# Patient Record
Sex: MALE | Race: WHITE | NOT HISPANIC OR LATINO | Employment: OTHER | URBAN - METROPOLITAN AREA
[De-identification: names, ages, dates, MRNs, and addresses within clinical notes are randomized per-mention and may not be internally consistent; named-entity substitution may affect disease eponyms.]

---

## 2023-05-23 ENCOUNTER — TRANSFERRED RECORDS (OUTPATIENT)
Dept: HEALTH INFORMATION MANAGEMENT | Facility: CLINIC | Age: 68
End: 2023-05-23

## 2023-06-05 ENCOUNTER — TRANSFERRED RECORDS (OUTPATIENT)
Dept: HEALTH INFORMATION MANAGEMENT | Facility: CLINIC | Age: 68
End: 2023-06-05

## 2023-06-06 ENCOUNTER — TRANSFERRED RECORDS (OUTPATIENT)
Dept: HEALTH INFORMATION MANAGEMENT | Facility: CLINIC | Age: 68
End: 2023-06-06

## 2023-06-06 LAB
ALT SERPL-CCNC: 42 U/L
AST SERPL-CCNC: 43 U/L (ref 17–59)
CREATININE (EXTERNAL): 0.83 MG/DL (ref 0.66–1.25)
GFR ESTIMATED (EXTERNAL): >90 ML/MIN/1.73M2
GLUCOSE (EXTERNAL): 138 MG/DL (ref 75–100)
HBA1C MFR BLD: 6.3 %
POTASSIUM (EXTERNAL): 4.3 MMOL/L (ref 3.5–5.1)

## 2023-06-07 ENCOUNTER — MEDICAL CORRESPONDENCE (OUTPATIENT)
Dept: HEALTH INFORMATION MANAGEMENT | Facility: CLINIC | Age: 68
End: 2023-06-07

## 2023-06-07 ENCOUNTER — TRANSFERRED RECORDS (OUTPATIENT)
Dept: HEALTH INFORMATION MANAGEMENT | Facility: CLINIC | Age: 68
End: 2023-06-07

## 2023-06-23 ENCOUNTER — TRANSCRIBE ORDERS (OUTPATIENT)
Dept: OTHER | Age: 68
End: 2023-06-23

## 2023-06-23 DIAGNOSIS — E83.119 HEMOCHROMATOSIS, UNSPECIFIED HEMOCHROMATOSIS TYPE: ICD-10-CM

## 2023-06-23 DIAGNOSIS — K76.9 LIVER LESION, RIGHT LOBE: Primary | ICD-10-CM

## 2023-06-27 NOTE — CONFIDENTIAL NOTE
DIAGNOSIS:    Liver lesion, right lobe   Hemochromatosis, unspecified hemochromatosis type      Appt Date: 08.25.2023   NOTES STATUS DETAILS   OFFICE NOTE from referring provider Received / CE 06.26.2023 Jaylene De Leon MD - [facility]   OFFICE NOTES from other specialists     DISCHARGE SUMMARY from hospital     MEDICATION LIST Care Everywhere / Received    LIVER BIOSPY (IF APPLICABLE)      PATHOLOGY REPORTS      IMAGING     ENDOSCOPY (IF AVAILABLE)     COLONOSCOPY (IF AVAILABLE)     ULTRASOUND LIVER Care Everywhere - Received 05.23.2023 US Abd Limited Liver   CT OF ABDOMEN  06.05.2023 CT Abd Pelvis   MRI OF LIVER Care Everywhere - Received 06.05.2023 MR ABDOMEN LIVER WWO     FIBROSCAN, US ELASTOGRAPHY, FIBROSIS SCAN, MR ELASTOGRAPHY     LABS     HEPATIC PANEL (LIVER PANEL)     BASIC METABOLIC PANEL     COMPLETE METABOLIC PANEL Care Everywhere 06.06.2023   COMPLETE BLOOD COUNT (CBC) Care Everywhere 06.06.2023   INTERNATIONAL NORMALIZED RATIO (INR)     HEPATITIS C ANTIBODY     HEPATITIS C VIRAL LOAD/PCR     HEPATITIS C GENOTYPE     HEPATITIS B SURFACE ANTIGEN     HEPATITIS B SURFACE ANTIBODY     HEPATITIS B DNA QUANT LEVEL     HEPATITIS B CORE ANTIBODY       Action 06.27.2023 RM   Action Taken Pending images     Action 06.29.2023 RM   Action Taken Called Ralf to get images pushed called [phone] was told to fax to [phone] PENDING

## 2023-07-14 ENCOUNTER — DOCUMENTATION ONLY (OUTPATIENT)
Dept: TRANSPLANT | Facility: CLINIC | Age: 68
End: 2023-07-14
Payer: MEDICARE

## 2023-07-14 ENCOUNTER — HOSPITAL ENCOUNTER (INPATIENT)
Dept: GENERAL RADIOLOGY | Facility: CLINIC | Age: 68
Discharge: HOME | End: 2023-07-14
Attending: STUDENT IN AN ORGANIZED HEALTH CARE EDUCATION/TRAINING PROGRAM
Payer: MEDICARE

## 2023-07-14 ENCOUNTER — VIRTUAL VISIT (OUTPATIENT)
Dept: GASTROENTEROLOGY | Facility: CLINIC | Age: 68
End: 2023-07-14
Attending: INTERNAL MEDICINE
Payer: MEDICARE

## 2023-07-14 VITALS — WEIGHT: 232 LBS | BODY MASS INDEX: 35.16 KG/M2 | HEIGHT: 68 IN

## 2023-07-14 DIAGNOSIS — K74.60 CIRRHOSIS OF LIVER (H): ICD-10-CM

## 2023-07-14 DIAGNOSIS — K76.9 LIVER LESION, RIGHT LOBE: ICD-10-CM

## 2023-07-14 DIAGNOSIS — E83.119 HEMOCHROMATOSIS, UNSPECIFIED HEMOCHROMATOSIS TYPE: ICD-10-CM

## 2023-07-14 DIAGNOSIS — C22.0 HCC (HEPATOCELLULAR CARCINOMA) (H): Primary | ICD-10-CM

## 2023-07-14 PROCEDURE — 999N000122 MR OUTSIDE READ

## 2023-07-14 PROCEDURE — 74183 MRI ABD W/O CNTR FLWD CNTR: CPT | Mod: 26 | Performed by: STUDENT IN AN ORGANIZED HEALTH CARE EDUCATION/TRAINING PROGRAM

## 2023-07-14 PROCEDURE — 99205 OFFICE O/P NEW HI 60 MIN: CPT | Mod: VID | Performed by: STUDENT IN AN ORGANIZED HEALTH CARE EDUCATION/TRAINING PROGRAM

## 2023-07-14 RX ORDER — AMLODIPINE BESYLATE 10 MG/1
1 TABLET ORAL DAILY
COMMUNITY
Start: 2013-07-10

## 2023-07-14 RX ORDER — ATORVASTATIN CALCIUM 10 MG/1
5 TABLET, FILM COATED ORAL
COMMUNITY
Start: 2013-07-10

## 2023-07-14 RX ORDER — LANCETS
EACH MISCELLANEOUS
COMMUNITY
Start: 2023-04-20

## 2023-07-14 RX ORDER — LISINOPRIL AND HYDROCHLOROTHIAZIDE 12.5; 2 MG/1; MG/1
1 TABLET ORAL DAILY
COMMUNITY

## 2023-07-14 RX ORDER — BLOOD SUGAR DIAGNOSTIC
STRIP MISCELLANEOUS
COMMUNITY
Start: 2023-04-25

## 2023-07-14 ASSESSMENT — PAIN SCALES - GENERAL: PAINLEVEL: NO PAIN (0)

## 2023-07-14 NOTE — PROGRESS NOTES
Virtual Visit Details    Type of service:  Video Visit   Video Start Time: 11:00 AM  Video End Time: 11:28 AM     Originating Location (pt. Location): Home    Distant Location (provider location):  Off-site  Platform used for Video Visit: [facility] New Patient Visit    Date of Visit: July 14, 2023    Reason for referral:  Liver lesion, cirrhosis    Subjective: Mr. Boston is a 67 year old man with a history of HTN, HLD, DM, hemochromatosis on phlebotomy, who presents for evaluation of cirrhosis and a liver lesion seen on MRI    He was diagnosed with hemochromatosis years ago after his brother was. 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 Gets it every 2-3 months and his ferritin has been > 50. He has had imaging in the past that has showed hepatomegaly and steatosis, without signs of cirrhosis. He has never had a liver bx, has not followed with hepatology or GI.     He had a RUQ US 5/2023 that showed a mass in the right lobe of the liver with background cirrhosis. This was followed up by a liver MRI that showed LR 4 lesion in the right lobe of the liver. Also showed cirrhosis with evidence of regenerative nodules. No ascites.     He reports he stopped drinking 3 months ago, was drinking 5 beers/week. No history of treatment or DUIs.     Has a good functional status, can walk 1/2 mile several times a day    ROS: 14 point ROS negative except for positives noted in HPI.    PMHx:  DM  HTN  HLD  No known history of heart disease  No personal history of cancer    PSHx:  Ankle surgery for fracture  R parotid grand resection due to enlargement - thought it was cancer but it wasn't    FamHx:  No family history of liver disease, liver cancer  Brother with HC  Other brother had a liver transplant for ARLD  CVA    SocHx:  Social History     Socioeconomic History     Marital status:      Spouse name: Not on file     Number of children: Not on file     Years of  "education: Not on file     Highest education level: Not on file   Occupational History     Not on file   Tobacco Use     Smoking status: Former     Years: 30.00     Types: Cigarettes     Smokeless tobacco: Never   Vaping Use     Vaping Use: Never used   Substance and Sexual Activity     Alcohol use: Not on file     Drug use: Not on file     Sexual activity: Not on file   Other Topics Concern     Not on file   Social History Narrative     Not on file     Social Determinants of Health     Financial Resource Strain: Not on file   Food Insecurity: Not on file   Transportation Needs: Not on file   Physical Activity: Not on file   Stress: Not on file   Social Connections: Not on file   Intimate Partner Violence: Not on file   Housing Stability: Not on file   Last drink 3 months ago, would drink 5 beers/week, denies history of DUI  YOSSI - Felicity helps with care  Retired - previously worked as a  for [place] [employer] [place]    Medications:  Current Outpatient Medications   Medication     ACCU-CHEK GUIDE test strip     amLODIPine (NORVASC) 10 MG tablet     atorvastatin (LIPITOR) 10 MG tablet     blood glucose monitoring (ACCU-CHEK FASTCLIX) lancets     lisinopril-hydrochlorothiazide (ZESTORETIC) 20-12.5 MG tablet     metFORMIN (GLUCOPHAGE) 1000 MG tablet     study - aspirin, IDS# [id number], 81 mg tablet     No current facility-administered medications for this visit.     No OTCs, herbals    Allergies:  Allergies   Allergen Reactions     Atorvastatin Other (See Comments)     Other reaction(s): *Unknown  Abnormal Liver Functions, ( is taking this at a small dose)  Abnormal Liver Functions, ( is taking this at a small dose)       Penicillins Other (See Comments) and Unknown     Other reaction(s): *Unknown - Childhood Rxn         Objective:  Ht 1.727 m (5' 8\")   Wt 105.2 kg (232 lb)   BMI 35.28 kg/m    Constitutional: pleasant man in NAD  Eyes: non icteric  Respiratory: Normal respiratory " excursion   MSK: normal range of motion of visualized extremities  Abd: Non distended  Skin: No jaundice  Psychiatric: normal mood and orientation    Labs: Reviewed in EHR, no recent AFP    TBR 1.2   Platelets 139,000    Imaging: Reviewed in EHR    Independently reviewed labs and imaging.     Assessment/Plan: Mr. Boston is a 67 year old man with a history of HTN, HLD, DM, hemochromatosis on phlebotomy, who presents for evaluation of cirrhosis and a liver lesion seen on MRI.     Our radiology team needs more time to review his imaging, will request an over read. If LR5 lesions would refer to IR for LR treatment. Would also refer for liver transplant if T2 and within [place]. Would also need CT chest. The last MRI was done about 6 weeks ago, so may consider a repeat imaging study.     Discussed HCC as an indication for LT evaluation. Discussed the basics of transplant evaluation, including medical, surgery, and social components. Will ask if he has had an colonoscopy, I do not have records of this.     Orders Placed This Encounter   Procedures     AFP tumor marker     Adult GI  Referral - Procedure Only       RTC 6 months.    Mary Mueller MD MS  Hepatology/Liver Transplant  [facility]    Approximately 28 minutes was spent for the visit with 32 minutes of non face-to-face time were spent in review of the patient's medical record on the day of the visit. This included review of previous: clinic visits, hospital records, lab results, imaging studies, and documentation.  The findings from this review are summarized in the above note.

## 2023-07-14 NOTE — PROGRESS NOTES
OSH MRI 6/5/23  1.) seg 4a lesion - 2.3 cm enhance & washout 5B  2.) other potential lesion - LR-4    Recs:  - outside read needed  - IR or other treatment of 2.3 cm lesion  - consideration of treatment for LR-4 lesion    Dr. Mueller to notify me if patient to proceed with transplant referral.

## 2023-07-14 NOTE — NURSING NOTE
Is the patient currently in the state of [place]? YES    Visit mode:VIDEO    If the visit is dropped, the patient can be reconnected by: VIDEO VISIT: Text to cell phone: [phone]    Will anyone else be joining the visit? NO      How would you like to obtain your AVS? MyChart    Are changes needed to the allergy or medication list? YES: All medications reconciled per pt    Reason for visit: Consult      Gisella Contreras

## 2023-07-15 ENCOUNTER — HOSPITAL ENCOUNTER (INPATIENT)
Dept: GENERAL RADIOLOGY | Facility: CLINIC | Age: 68
Discharge: HOME | End: 2023-07-15
Attending: STUDENT IN AN ORGANIZED HEALTH CARE EDUCATION/TRAINING PROGRAM
Payer: MEDICARE

## 2023-07-15 DIAGNOSIS — K74.60 CIRRHOSIS OF LIVER (H): ICD-10-CM

## 2023-07-15 DIAGNOSIS — C22.0 HCC (HEPATOCELLULAR CARCINOMA) (H): ICD-10-CM

## 2023-07-17 ENCOUNTER — MYC MEDICAL ADVICE (OUTPATIENT)
Dept: GASTROENTEROLOGY | Facility: CLINIC | Age: 68
End: 2023-07-17
Payer: MEDICARE

## 2023-07-17 DIAGNOSIS — K70.30 ALCOHOLIC CIRRHOSIS OF LIVER (H): Primary | ICD-10-CM

## 2023-07-17 NOTE — TELEPHONE ENCOUNTER
EGD order faxed to patient requested location as stated in mychart message.    Lila DEL CID,ELENA  Hepatology Clinic

## 2023-07-25 ENCOUNTER — TELEPHONE (OUTPATIENT)
Dept: GASTROENTEROLOGY | Facility: CLINIC | Age: 68
End: 2023-07-25
Payer: MEDICARE

## 2023-07-25 NOTE — TELEPHONE ENCOUNTER
Called patient to schedule labs 1 week prior to appointment on 1/19/2024. Sent staff message to have clinic staff to fax order to [facility] in [place], [place] // 07/25/2023 MCE

## 2023-07-28 DIAGNOSIS — K76.9 LIVER LESION: Primary | ICD-10-CM

## 2023-07-28 NOTE — CONFIDENTIAL NOTE
Reviewed his imaging in tumor board - MRI showing LR-4 in segment 5, and central LR3. Plan for referral to IR to discuss bx and ablation, patient is aware of this. If bx shows HCC, will start liver transplant evaluation.    Hepatology Team - can you fax order for AFP for to his [facility] clinic?

## 2023-07-31 ENCOUNTER — MEDICAL CORRESPONDENCE (OUTPATIENT)
Dept: HEALTH INFORMATION MANAGEMENT | Facility: CLINIC | Age: 68
End: 2023-07-31
Payer: MEDICARE

## 2023-07-31 DIAGNOSIS — K76.9 LIVER LESION: Primary | ICD-10-CM

## 2023-07-31 DIAGNOSIS — E83.119 HEMOCHROMATOSIS, UNSPECIFIED HEMOCHROMATOSIS TYPE: ICD-10-CM

## 2023-08-01 DIAGNOSIS — R93.2 ABNORMAL FINDINGS ON DIAGNOSTIC IMAGING OF LIVER AND BILIARY TRACT: ICD-10-CM

## 2023-08-01 DIAGNOSIS — R97.8 OTHER ABNORMAL TUMOR MARKERS: ICD-10-CM

## 2023-08-01 DIAGNOSIS — K76.9 LIVER LESION: Primary | ICD-10-CM

## 2023-08-03 ENCOUNTER — TELEPHONE (OUTPATIENT)
Dept: GASTROENTEROLOGY | Facility: CLINIC | Age: 68
End: 2023-08-03
Payer: MEDICARE

## 2023-08-03 NOTE — TELEPHONE ENCOUNTER
"MRI order faxed to number provided by patient.    Lila DEL CID LPN  Hepatology Clinic     ---------------  [facility] Call Center    Phone Message    May a detailed message be left on voicemail: yes     Reason for Call: Other: Patient called and needs the \"MR Liver wo & w Contrast\" order refaxed to: [phone].  Please refax.     Action Taken: Message routed to:  [facility] ([facility]    [place] Screening: Not Applicable                                                                    "

## 2023-08-10 ENCOUNTER — TELEPHONE (OUTPATIENT)
Dept: GASTROENTEROLOGY | Facility: CLINIC | Age: 68
End: 2023-08-10
Payer: MEDICARE

## 2023-08-10 NOTE — TELEPHONE ENCOUNTER
"Cecille called from [facility] regarding patient's MRI done 3 days ago.     Informed Cecille Mueller left a voicemail yesterday at 2:40 PM with the information of \"we wanted a repeat because it has been 2 months and was last read as LR-4. Seeing IR soon to discuss biopsy ablation\". Also let Cecille know patient has appointment on 8/15/23 with IR.     Cecille would let the radiologist know.    VICKI Purvis, RN, PHN  Hepatology Clinic  [facility]  [facility]      "

## 2023-08-13 ENCOUNTER — HEALTH MAINTENANCE LETTER (OUTPATIENT)
Age: 68
End: 2023-08-13

## 2023-08-15 ENCOUNTER — HOSPITAL ENCOUNTER (INPATIENT)
Dept: GENERAL RADIOLOGY | Facility: CLINIC | Age: 68
Discharge: HOME | End: 2023-08-15
Attending: RADIOLOGY
Payer: MEDICARE

## 2023-08-15 ENCOUNTER — VIRTUAL VISIT (OUTPATIENT)
Dept: RADIOLOGY | Facility: CLINIC | Age: 68
End: 2023-08-15
Attending: RADIOLOGY
Payer: MEDICARE

## 2023-08-15 VITALS — BODY MASS INDEX: 35.61 KG/M2 | HEIGHT: 68 IN | WEIGHT: 235 LBS

## 2023-08-15 DIAGNOSIS — K76.9 LIVER LESION: ICD-10-CM

## 2023-08-15 PROCEDURE — 74183 MRI ABD W/O CNTR FLWD CNTR: CPT | Mod: 26 | Performed by: RADIOLOGY

## 2023-08-15 PROCEDURE — 99204 OFFICE O/P NEW MOD 45 MIN: CPT | Mod: 95 | Performed by: RADIOLOGY

## 2023-08-15 PROCEDURE — 999N000122 MR OUTSIDE READ

## 2023-08-15 ASSESSMENT — PAIN SCALES - GENERAL: PAINLEVEL: NO PAIN (0)

## 2023-08-15 NOTE — PROGRESS NOTES
Virtual Visit Details    Type of service:  Video Visit   Video Start Time: 12:8  Video End Time:12:23    Originating Location (pt. Location): Home    Distant Location (provider location):  Off-site  Platform used for Video Visit: Max       Interventional Radiology Clinic:    HPI:       Mr. Boston is a 67 year old man with a history of HTN, HLD, DM, hemochromatosis on phlebotomy, and liver lesion referred for evaluation for liver directed therapy.   He mentions that he had Hep B and was also using alcohol in a regular basis but no more alcohol since April 23rd.   The patient has no symptoms and is very active.  I looked at the imaging and labs:    US of RUQ on 5/2023 showed a mass in the right lobe of the liver with background cirrhosis. Liver MRI showed LR 4 lesion in the right lobe (segment5).      Ref Range & Units 6 d ago   ALBUMIN 3.5 - 5.0 g/dL 4.5    PROTEIN,TOTAL 6.3 - 8.2 g/dL 8.1    GLOBULIN                  2.0 - 4.0 g/dL 3.6    A/G RATIO  1.3    BILIRUBIN,TOTAL 0.2 - 1.3 mg/dL 1.7 High     BILIRUBIN,DIRECT 0.0 - 0.3 mg/dL 0.0    BILIRUBIN,INDIRECT 0.0 - 1.1 mg/dL 1.5 High     ALK PHOSPHATASE  38 - 126 U/L 81    AST (SGOT) 17 - 59 U/L 47    ALTv (SGPT) <50 U/L 50 High       AFP: 5.6    His ECOG is 0.        Social History     Tobacco Use     Smoking status: Former     Years: 30.00     Types: Cigarettes     Smokeless tobacco: Never   Substance Use Topics     Alcohol use: Not on file       Impression and Plan:    2 cm liver lesion OPTN 4 by imaging and visible under the US. I discussed the procedure that includes a Biopsy followed by the ablation. The patient understands and would like to proceed. We could use the US in the CT room.  Biopsy and ablation of an OPTN 4 lesion in the segment 5.

## 2023-08-15 NOTE — LETTER
8/15/2023         RE: Dereck Boston  [id number] [place] [address]        Dear Colleague,    Thank you for referring your patient, Dereck Boston, to the [facility]. Please see a copy of my visit note below.    Virtual Visit Details    Type of service:  Video Visit   Video Start Time: 12:8  Video End Time:12:23    Originating Location (pt. Location): Home    Distant Location (provider location):  Off-site  Platform used for Video Visit: [facility]       Interventional Radiology Clinic:    HPI:       Mr. Boston is a 67 year old man with a history of HTN, HLD, DM, hemochromatosis on phlebotomy, and liver lesion referred for evaluation for liver directed therapy.   He mentions that he had Hep B and was also using alcohol in a regular basis but no more alcohol since April 23rd.   The patient has no symptoms and is very active.  I looked at the imaging and labs:    US of RUQ on 5/2023 showed a mass in the right lobe of the liver with background cirrhosis. Liver MRI showed LR 4 lesion in the right lobe (segment5).      Ref Range & Units 6 d ago   ALBUMIN 3.5 - 5.0 g/dL 4.5    PROTEIN,TOTAL 6.3 - 8.2 g/dL 8.1    GLOBULIN                  2.0 - 4.0 g/dL 3.6    A/G RATIO  1.3    BILIRUBIN,TOTAL 0.2 - 1.3 mg/dL 1.7 High     BILIRUBIN,DIRECT 0.0 - 0.3 mg/dL 0.0    BILIRUBIN,INDIRECT 0.0 - 1.1 mg/dL 1.5 High     ALK PHOSPHATASE  38 - 126 U/L 81    AST (SGOT) 17 - 59 U/L 47    ALTv (SGPT) <50 U/L 50 High       AFP: 5.6    His ECOG is 0.        Social History     Tobacco Use    Smoking status: Former     Years: 30.00     Types: Cigarettes    Smokeless tobacco: Never   Substance Use Topics    Alcohol use: Not on file       Impression and Plan:    2 cm liver lesion OPTN 4 by imaging and visible under the US. I discussed the procedure that includes a Biopsy followed by the ablation. The patient understands and would like to proceed. We could use the US in the CT room.  Biopsy and ablation of an OPTN 4  lesion in the segment 5.      Kia Bonilla MD

## 2023-08-15 NOTE — NURSING NOTE
Is the patient currently in the state of [place]? YES    Visit mode:VIDEO    If the visit is dropped, the patient can be reconnected by: VIDEO VISIT: Send to e-mail at: [email or web address]    Will anyone else be joining the visit? NO      How would you like to obtain your AVS? MyChart    Are changes needed to the allergy or medication list? NO    Reason for visit: DAVID DE OLIVEIRA, VF

## 2023-08-25 ENCOUNTER — PRE VISIT (OUTPATIENT)
Dept: GASTROENTEROLOGY | Facility: CLINIC | Age: 68
End: 2023-08-25
Payer: MEDICARE

## 2023-09-19 ENCOUNTER — ANESTHESIA EVENT (OUTPATIENT)
Dept: SURGERY | Facility: CLINIC | Age: 68
End: 2023-09-19
Payer: MEDICARE

## 2023-09-20 ENCOUNTER — APPOINTMENT (OUTPATIENT)
Dept: INTERVENTIONAL RADIOLOGY/VASCULAR | Facility: CLINIC | Age: 68
End: 2023-09-20
Attending: RADIOLOGY
Payer: MEDICARE

## 2023-09-20 ENCOUNTER — HOSPITAL ENCOUNTER (OUTPATIENT)
Facility: CLINIC | Age: 68
Discharge: HOME | End: 2023-09-20
Attending: RADIOLOGY | Admitting: RADIOLOGY
Payer: MEDICARE

## 2023-09-20 ENCOUNTER — ANESTHESIA (OUTPATIENT)
Dept: SURGERY | Facility: CLINIC | Age: 68
End: 2023-09-20
Payer: MEDICARE

## 2023-09-20 VITALS
HEART RATE: 86 BPM | TEMPERATURE: 97.9 F | OXYGEN SATURATION: 94 % | WEIGHT: 230.38 LBS | DIASTOLIC BLOOD PRESSURE: 84 MMHG | HEIGHT: 67 IN | RESPIRATION RATE: 16 BRPM | BODY MASS INDEX: 36.16 KG/M2 | SYSTOLIC BLOOD PRESSURE: 144 MMHG

## 2023-09-20 VITALS — HEART RATE: 82 BPM

## 2023-09-20 DIAGNOSIS — K76.9 LIVER LESION: ICD-10-CM

## 2023-09-20 LAB
ABO/RH(D): NORMAL
ALBUMIN SERPL BCG-MCNC: 4.7 G/DL (ref 3.5–5.2)
ALP SERPL-CCNC: 81 U/L (ref 40–129)
ALT SERPL W P-5'-P-CCNC: 40 U/L (ref 0–70)
ANION GAP SERPL CALCULATED.3IONS-SCNC: 14 MMOL/L (ref 7–15)
ANTIBODY SCREEN: NEGATIVE
AST SERPL W P-5'-P-CCNC: 46 U/L (ref 0–45)
BILIRUB DIRECT SERPL-MCNC: 0.35 MG/DL (ref 0–0.3)
BILIRUB SERPL-MCNC: 1.4 MG/DL
BUN SERPL-MCNC: 16.7 MG/DL (ref 8–23)
CALCIUM SERPL-MCNC: 10 MG/DL (ref 8.8–10.2)
CHLORIDE SERPL-SCNC: 101 MMOL/L (ref 98–107)
CREAT SERPL-MCNC: 0.83 MG/DL (ref 0.67–1.17)
DEPRECATED HCO3 PLAS-SCNC: 21 MMOL/L (ref 22–29)
EGFRCR SERPLBLD CKD-EPI 2021: >90 ML/MIN/1.73M2
ERYTHROCYTE [DISTWIDTH] IN BLOOD BY AUTOMATED COUNT: 12.8 % (ref 10–15)
GLUCOSE BLDC GLUCOMTR-MCNC: 130 MG/DL (ref 70–99)
GLUCOSE BLDC GLUCOMTR-MCNC: 158 MG/DL (ref 70–99)
GLUCOSE SERPL-MCNC: 148 MG/DL (ref 70–99)
HCT VFR BLD AUTO: 43.2 % (ref 40–53)
HGB BLD-MCNC: 15.4 G/DL (ref 13.3–17.7)
INR PPP: 1.1 (ref 0.85–1.15)
MCH RBC QN AUTO: 32 PG (ref 26.5–33)
MCHC RBC AUTO-ENTMCNC: 35.6 G/DL (ref 31.5–36.5)
MCV RBC AUTO: 90 FL (ref 78–100)
PLATELET # BLD AUTO: 154 10E3/UL (ref 150–450)
POTASSIUM SERPL-SCNC: 4.3 MMOL/L (ref 3.4–5.3)
PROT SERPL-MCNC: 8 G/DL (ref 6.4–8.3)
RBC # BLD AUTO: 4.81 10E6/UL (ref 4.4–5.9)
SODIUM SERPL-SCNC: 136 MMOL/L (ref 136–145)
SPECIMEN EXPIRATION DATE: NORMAL
WBC # BLD AUTO: 7.9 10E3/UL (ref 4–11)

## 2023-09-20 PROCEDURE — 82962 GLUCOSE BLOOD TEST: CPT

## 2023-09-20 PROCEDURE — 272N000504 HC NEEDLE CR4

## 2023-09-20 PROCEDURE — 36415 COLL VENOUS BLD VENIPUNCTURE: CPT | Performed by: NURSE PRACTITIONER

## 2023-09-20 PROCEDURE — 85610 PROTHROMBIN TIME: CPT | Performed by: NURSE PRACTITIONER

## 2023-09-20 PROCEDURE — 47000 NEEDLE BIOPSY OF LIVER PERQ: CPT | Mod: GC | Performed by: RADIOLOGY

## 2023-09-20 PROCEDURE — 710N000009 HC RECOVERY PHASE 1, LEVEL 1, PER MIN

## 2023-09-20 PROCEDURE — 76942 ECHO GUIDE FOR BIOPSY: CPT | Mod: 26 | Performed by: RADIOLOGY

## 2023-09-20 PROCEDURE — 86901 BLOOD TYPING SEROLOGIC RH(D): CPT

## 2023-09-20 PROCEDURE — C1769 GUIDE WIRE: HCPCS

## 2023-09-20 PROCEDURE — 255N000002 HC RX 255 OP 636: Performed by: RADIOLOGY

## 2023-09-20 PROCEDURE — 710N000012 HC RECOVERY PHASE 2, PER MINUTE

## 2023-09-20 PROCEDURE — 77012 CT SCAN FOR NEEDLE BIOPSY: CPT

## 2023-09-20 PROCEDURE — 258N000003 HC RX IP 258 OP 636: Performed by: NURSE ANESTHETIST, CERTIFIED REGISTERED

## 2023-09-20 PROCEDURE — 999N000141 HC STATISTIC PRE-PROCEDURE NURSING ASSESSMENT

## 2023-09-20 PROCEDURE — 250N000024 HC ISOFLURANE, PER MIN

## 2023-09-20 PROCEDURE — 82248 BILIRUBIN DIRECT: CPT | Performed by: NURSE PRACTITIONER

## 2023-09-20 PROCEDURE — 272N000505 HC NEEDLE CR5

## 2023-09-20 PROCEDURE — 370N000017 HC ANESTHESIA TECHNICAL FEE, PER MIN

## 2023-09-20 PROCEDURE — 85027 COMPLETE CBC AUTOMATED: CPT | Performed by: NURSE PRACTITIONER

## 2023-09-20 PROCEDURE — 250N000011 HC RX IP 250 OP 636: Performed by: NURSE PRACTITIONER

## 2023-09-20 PROCEDURE — 250N000011 HC RX IP 250 OP 636: Mod: JZ | Performed by: NURSE ANESTHETIST, CERTIFIED REGISTERED

## 2023-09-20 PROCEDURE — 272N000302 HC DEVICE INFLATION CR5

## 2023-09-20 PROCEDURE — 250N000009 HC RX 250: Performed by: NURSE ANESTHETIST, CERTIFIED REGISTERED

## 2023-09-20 PROCEDURE — 86850 RBC ANTIBODY SCREEN: CPT

## 2023-09-20 PROCEDURE — 88307 TISSUE EXAM BY PATHOLOGIST: CPT | Mod: TC | Performed by: RADIOLOGY

## 2023-09-20 PROCEDURE — 250N000009 HC RX 250: Performed by: RADIOLOGY

## 2023-09-20 RX ORDER — FENTANYL CITRATE 50 UG/ML
50 INJECTION, SOLUTION INTRAMUSCULAR; INTRAVENOUS EVERY 5 MIN PRN
Status: DISCONTINUED | OUTPATIENT
Start: 2023-09-20 | End: 2023-09-20 | Stop reason: HOSPADM

## 2023-09-20 RX ORDER — FENTANYL CITRATE 50 UG/ML
INJECTION, SOLUTION INTRAMUSCULAR; INTRAVENOUS PRN
Status: DISCONTINUED | OUTPATIENT
Start: 2023-09-20 | End: 2023-09-20

## 2023-09-20 RX ORDER — NICOTINE POLACRILEX 4 MG
15-30 LOZENGE BUCCAL
Status: DISCONTINUED | OUTPATIENT
Start: 2023-09-20 | End: 2023-09-20 | Stop reason: HOSPADM

## 2023-09-20 RX ORDER — ONDANSETRON 4 MG/1
4 TABLET, ORALLY DISINTEGRATING ORAL EVERY 30 MIN PRN
Status: DISCONTINUED | OUTPATIENT
Start: 2023-09-20 | End: 2023-09-20 | Stop reason: HOSPADM

## 2023-09-20 RX ORDER — LIDOCAINE HYDROCHLORIDE 20 MG/ML
INJECTION, SOLUTION INFILTRATION; PERINEURAL PRN
Status: DISCONTINUED | OUTPATIENT
Start: 2023-09-20 | End: 2023-09-20

## 2023-09-20 RX ORDER — DEXAMETHASONE SODIUM PHOSPHATE 4 MG/ML
INJECTION, SOLUTION INTRA-ARTICULAR; INTRALESIONAL; INTRAMUSCULAR; INTRAVENOUS; SOFT TISSUE PRN
Status: DISCONTINUED | OUTPATIENT
Start: 2023-09-20 | End: 2023-09-20

## 2023-09-20 RX ORDER — HYDROMORPHONE HYDROCHLORIDE 1 MG/ML
0.4 INJECTION, SOLUTION INTRAMUSCULAR; INTRAVENOUS; SUBCUTANEOUS EVERY 5 MIN PRN
Status: DISCONTINUED | OUTPATIENT
Start: 2023-09-20 | End: 2023-09-20 | Stop reason: HOSPADM

## 2023-09-20 RX ORDER — FENTANYL CITRATE 50 UG/ML
25 INJECTION, SOLUTION INTRAMUSCULAR; INTRAVENOUS EVERY 5 MIN PRN
Status: DISCONTINUED | OUTPATIENT
Start: 2023-09-20 | End: 2023-09-20 | Stop reason: HOSPADM

## 2023-09-20 RX ORDER — CEFAZOLIN SODIUM/WATER 2 G/20 ML
2 SYRINGE (ML) INTRAVENOUS SEE ADMIN INSTRUCTIONS
Status: DISCONTINUED | OUTPATIENT
Start: 2023-09-20 | End: 2023-09-20 | Stop reason: HOSPADM

## 2023-09-20 RX ORDER — ONDANSETRON 2 MG/ML
4 INJECTION INTRAMUSCULAR; INTRAVENOUS EVERY 30 MIN PRN
Status: DISCONTINUED | OUTPATIENT
Start: 2023-09-20 | End: 2023-09-20 | Stop reason: HOSPADM

## 2023-09-20 RX ORDER — IODIXANOL 320 MG/ML
100 INJECTION, SOLUTION INTRAVASCULAR ONCE
Status: COMPLETED | OUTPATIENT
Start: 2023-09-20 | End: 2023-09-20

## 2023-09-20 RX ORDER — PROPOFOL 10 MG/ML
INJECTION, EMULSION INTRAVENOUS PRN
Status: DISCONTINUED | OUTPATIENT
Start: 2023-09-20 | End: 2023-09-20

## 2023-09-20 RX ORDER — HYDROMORPHONE HYDROCHLORIDE 1 MG/ML
0.2 INJECTION, SOLUTION INTRAMUSCULAR; INTRAVENOUS; SUBCUTANEOUS EVERY 5 MIN PRN
Status: DISCONTINUED | OUTPATIENT
Start: 2023-09-20 | End: 2023-09-20 | Stop reason: HOSPADM

## 2023-09-20 RX ORDER — SODIUM CHLORIDE 9 MG/ML
INJECTION, SOLUTION INTRAVENOUS CONTINUOUS PRN
Status: DISCONTINUED | OUTPATIENT
Start: 2023-09-20 | End: 2023-09-20

## 2023-09-20 RX ORDER — ONDANSETRON 2 MG/ML
4 INJECTION INTRAMUSCULAR; INTRAVENOUS ONCE
Status: DISCONTINUED | OUTPATIENT
Start: 2023-09-20 | End: 2023-09-20 | Stop reason: HOSPADM

## 2023-09-20 RX ORDER — LIDOCAINE HYDROCHLORIDE 10 MG/ML
1-30 INJECTION, SOLUTION EPIDURAL; INFILTRATION; INTRACAUDAL; PERINEURAL
Status: COMPLETED | OUTPATIENT
Start: 2023-09-20 | End: 2023-09-20

## 2023-09-20 RX ORDER — KETOROLAC TROMETHAMINE 30 MG/ML
15 INJECTION, SOLUTION INTRAMUSCULAR; INTRAVENOUS ONCE
Status: DISCONTINUED | OUTPATIENT
Start: 2023-09-20 | End: 2023-09-20 | Stop reason: HOSPADM

## 2023-09-20 RX ORDER — DEXTROSE MONOHYDRATE 25 G/50ML
25-50 INJECTION, SOLUTION INTRAVENOUS
Status: DISCONTINUED | OUTPATIENT
Start: 2023-09-20 | End: 2023-09-20 | Stop reason: HOSPADM

## 2023-09-20 RX ORDER — ACETAMINOPHEN 325 MG/1
650 TABLET ORAL EVERY 4 HOURS PRN
Status: DISCONTINUED | OUTPATIENT
Start: 2023-09-20 | End: 2023-09-20 | Stop reason: HOSPADM

## 2023-09-20 RX ORDER — CEFAZOLIN SODIUM/WATER 2 G/20 ML
2 SYRINGE (ML) INTRAVENOUS
Status: COMPLETED | OUTPATIENT
Start: 2023-09-20 | End: 2023-09-20

## 2023-09-20 RX ORDER — OXYCODONE HYDROCHLORIDE 10 MG/1
10 TABLET ORAL
Status: DISCONTINUED | OUTPATIENT
Start: 2023-09-20 | End: 2023-09-20 | Stop reason: HOSPADM

## 2023-09-20 RX ORDER — OXYCODONE HYDROCHLORIDE 5 MG/1
5 TABLET ORAL
Status: DISCONTINUED | OUTPATIENT
Start: 2023-09-20 | End: 2023-09-20 | Stop reason: HOSPADM

## 2023-09-20 RX ORDER — SODIUM CHLORIDE, SODIUM LACTATE, POTASSIUM CHLORIDE, CALCIUM CHLORIDE 600; 310; 30; 20 MG/100ML; MG/100ML; MG/100ML; MG/100ML
INJECTION, SOLUTION INTRAVENOUS CONTINUOUS
Status: DISCONTINUED | OUTPATIENT
Start: 2023-09-20 | End: 2023-09-20 | Stop reason: HOSPADM

## 2023-09-20 RX ORDER — ONDANSETRON 2 MG/ML
INJECTION INTRAMUSCULAR; INTRAVENOUS PRN
Status: DISCONTINUED | OUTPATIENT
Start: 2023-09-20 | End: 2023-09-20

## 2023-09-20 RX ADMIN — IODIXANOL 20 ML: 320 INJECTION, SOLUTION INTRAVASCULAR at 14:28

## 2023-09-20 RX ADMIN — PROPOFOL 200 MG: 10 INJECTION, EMULSION INTRAVENOUS at 12:33

## 2023-09-20 RX ADMIN — Medication 50 MG: at 12:33

## 2023-09-20 RX ADMIN — ONDANSETRON 4 MG: 2 INJECTION INTRAMUSCULAR; INTRAVENOUS at 14:17

## 2023-09-20 RX ADMIN — SODIUM CHLORIDE: 9 INJECTION, SOLUTION INTRAVENOUS at 12:32

## 2023-09-20 RX ADMIN — Medication 20 MG: at 14:00

## 2023-09-20 RX ADMIN — LIDOCAINE HYDROCHLORIDE 100 MG: 20 INJECTION, SOLUTION INFILTRATION; PERINEURAL at 12:33

## 2023-09-20 RX ADMIN — SUGAMMADEX 200 MG: 100 INJECTION, SOLUTION INTRAVENOUS at 14:17

## 2023-09-20 RX ADMIN — Medication 2 G: at 12:30

## 2023-09-20 RX ADMIN — FENTANYL CITRATE 100 MCG: 50 INJECTION, SOLUTION INTRAMUSCULAR; INTRAVENOUS at 12:33

## 2023-09-20 RX ADMIN — Medication 20 MG: at 13:30

## 2023-09-20 RX ADMIN — DEXAMETHASONE SODIUM PHOSPHATE 4 MG: 4 INJECTION, SOLUTION INTRA-ARTICULAR; INTRALESIONAL; INTRAMUSCULAR; INTRAVENOUS; SOFT TISSUE at 12:45

## 2023-09-20 RX ADMIN — LIDOCAINE HYDROCHLORIDE 5 ML: 10 INJECTION, SOLUTION EPIDURAL; INFILTRATION; INTRACAUDAL; PERINEURAL at 14:17

## 2023-09-20 ASSESSMENT — ACTIVITIES OF DAILY LIVING (ADL)
ADLS_ACUITY_SCORE: 35
ADLS_ACUITY_SCORE: 35
ADLS_ACUITY_SCORE: 33
ADLS_ACUITY_SCORE: 35

## 2023-09-20 NOTE — ANESTHESIA PREPROCEDURE EVALUATION
Anesthesia Pre-Procedure Evaluation    Patient: Dereck Boston   MRN: [id number] : 1955        Procedure : Procedure(s):  ANESTHESIA OUT OF OR COMPUTED TOMOGRAPHY LIVER ABLATION AND LIVER BIOPSY@1100          Past Medical History:   Diagnosis Date    Diabetes (H)     Hypertension     Sleep apnea       Past Surgical History:   Procedure Laterality Date    ORTHOPEDIC SURGERY        Allergies   Allergen Reactions    Atorvastatin Other (See Comments)     Other reaction(s): *Unknown  Abnormal Liver Functions, ( is taking this at a small dose)  Abnormal Liver Functions, ( is taking this at a small dose)      Penicillins Other (See Comments) and Unknown     Other reaction(s): *Unknown - Childhood Rxn        Social History     Tobacco Use    Smoking status: Former     Years:      Types: Cigarettes    Smokeless tobacco: Never   Substance Use Topics    Alcohol use: Not Currently      Wt Readings from Last 1 Encounters:   23 104.5 kg (230 lb 6.1 oz)        Anesthesia Evaluation   Pt has had prior anesthetic. Type: General and MAC.        ROS/MED HX  ENT/Pulmonary:     (+) sleep apnea,                                      Neurologic:  - neg neurologic ROS     Cardiovascular:     (+)  hypertension- -   -  - -                                      METS/Exercise Tolerance:     Hematologic:       Musculoskeletal:  - neg musculoskeletal ROS     GI/Hepatic:     (+)           hepatitis type B,        Renal/Genitourinary:       Endo:     (+)  type II DM,                    Psychiatric/Substance Use:  - neg psychiatric ROS     Infectious Disease: Comment: Hepatitis B      Malignancy: Comment: Liver CA      Other:            Physical Exam    Airway        Mallampati: I   TM distance: > 3 FB   Neck ROM: full   Mouth opening: > 3 cm    Respiratory Devices and Support         Dental       (+) Minor Abnormalities - some fillings, tiny chips      Cardiovascular   cardiovascular exam normal       Rhythm and rate: regular and  normal     Pulmonary   pulmonary exam normal        breath sounds clear to auscultation           OUTSIDE LABS:  CBC:   Lab Results   Component Value Date    WBC 7.9 09/20/2023    HGB 15.4 09/20/2023    HCT 43.2 09/20/2023     09/20/2023     BMP:   Lab Results   Component Value Date     09/20/2023    POTASSIUM 4.3 09/20/2023    CHLORIDE 101 09/20/2023    CO2 21 (L) 09/20/2023    BUN 16.7 09/20/2023    CR 0.83 09/20/2023     (H) 09/20/2023     (H) 09/20/2023     COAGS:   Lab Results   Component Value Date    INR 1.10 09/20/2023     POC: No results found for: BGM, HCG, HCGS  HEPATIC:   Lab Results   Component Value Date    ALBUMIN 4.7 09/20/2023    PROTTOTAL 8.0 09/20/2023    ALT 40 09/20/2023    AST 46 (H) 09/20/2023    ALKPHOS 81 09/20/2023    BILITOTAL 1.4 (H) 09/20/2023     OTHER:   Lab Results   Component Value Date    MANI 10.0 09/20/2023       Anesthesia Plan    ASA Status:  4       Anesthesia Type: MAC.   Induction: Intravenous.   Maintenance: TIVA.   Techniques and Equipment:     - Lines/Monitors: Arterial Line, 2nd IV     Consents    Anesthesia Plan(s) and associated risks, benefits, and realistic alternatives discussed. Questions answered and patient/representative(s) expressed understanding.     - Discussed: Risks, Benefits and Alternatives for BOTH SEDATION and the PROCEDURE were discussed     - Discussed with:  Patient      - Extended Intubation/Ventilatory Support Discussed: Yes.      - Patient is DNR/DNI Status: No     Use of blood products discussed: Yes.     Postoperative Care    Pain management: IV analgesics.   PONV prophylaxis: Ondansetron (or other 5HT-3), Dexamethasone or Solumedrol     Comments:    Other Comments: The material risks, benefits, and alternatives were discussed in detail.  The patient agrees to proceed.  The patient has no other complaints at this time.            Shantanu Meek MD

## 2023-09-20 NOTE — DISCHARGE INSTRUCTIONS
[facility]  Same-Day Surgery   Adult Discharge Orders & Instructions     For 24 hours after surgery    Get plenty of rest.  A responsible adult must stay with you for at least 24 hours after you leave the hospital.   Do not drive or use heavy equipment.  If you have weakness or tingling, don't drive or use heavy equipment until this feeling goes away.  Do not drink alcohol.  Avoid strenuous or risky activities.  Ask for help when climbing stairs.   You may feel lightheaded.  IF so, sit for a few minutes before standing.  Have someone help you get up.   If you have nausea (feel sick to your stomach): Drink only clear liquids such as apple juice, ginger ale, broth or 7-Up.  Rest may also help.  Be sure to drink enough fluids.  Move to a regular diet as you feel able.  You may have a slight fever. Call the doctor if your fever is over 100 F (37.7 C) (taken under the tongue) or lasts longer than 24 hours.  You may have a dry mouth, a sore throat, muscle aches or trouble sleeping.  These should go away after 24 hours.  Do not make important or legal decisions.   Call your doctor for any of the followin.  Signs of infection (fever, growing tenderness at the surgery site, a large amount of drainage or bleeding, severe pain, foul-smelling drainage, redness, swelling).    2. It has been over 8 to 10 hours since surgery and you are still not able to urinate (pass water).    3.  Headache for over 24 hours.    To contact a doctor, call Dr. Kia Bonilla in the Radiology Department at [phone] or:    '   [phone] and ask for the resident on call for  Radiology (answered 24 hours a day)  '   Emergency Department:    [facility]: [phone]       (TTY for hearing impaired: [phone])

## 2023-09-20 NOTE — PROCEDURES
[facility]    Procedure: IR Procedure Note    Date/Time: 9/20/2023 2:29 PM    Performed by: Behzadi, Heshmatzadeh, MD  Authorized by: Kia Bonilla MD  IR Fellow Physician: ashkan Behzadi      UNIVERSAL PROTOCOL   Site Marked: NA  Prior Images Obtained and Reviewed:  Yes  Required items: Required blood products, implants, devices and special equipment available    Patient identity confirmed:  Verbally with patient, arm band, provided demographic data and hospital-assigned identification number  Patient was reevaluated immediately before administering moderate or deep sedation or anesthesia  Confirmation Checklist:  Patient's identity using two indicators, relevant allergies, procedure was appropriate and matched the consent or emergent situation and correct equipment/implants were available  Time out: Immediately prior to the procedure a time out was called    Universal Protocol: the Joint Commission Universal Protocol was followed    Preparation: Patient was prepped and draped in usual sterile fashion       ANESTHESIA    Anesthesia: Local infiltration  Local Anesthetic:  Lidocaine 1% without epinephrine      SEDATION  Patient Sedated: Yes    Sedation Type:  Deep  Vital signs: Vital signs monitored during sedation    See dictated procedure note for full details.  Findings: -    Specimens: none    Complications: None    Condition: Stable    Plan: Bed rest for 2 hours.    F/U with Biopsy result and patient will be schedule for possible TACE / Y90 therapy.           PROCEDURE  Describe Procedure: Uncomplicated segment 5 liver lesion biopsy.    The liver lesion was adjacent to bowel loop, attempted hydrodisection was unsuccessful.    No Ablation was performed.       Patient Tolerance:  Patient tolerated the procedure well with no immediate complications  Length of time physician/provider present for 1:1 monitoring during sedation: 60

## 2023-09-20 NOTE — ANESTHESIA CARE TRANSFER NOTE
Patient: Dereck Boston    Procedure: Procedure(s):  ANESTHESIA OUT OF OR COMPUTED TOMOGRAPHY LIVER ABLATION AND LIVER BIOPSY@1100       Diagnosis: Liver lesion [K76.9]  Diagnosis Additional Information: No value filed.    Anesthesia Type:   MAC     Note:    Oropharynx: oropharynx clear of all foreign objects  Level of Consciousness: awake  Oxygen Supplementation: nasal cannula  Level of Supplemental Oxygen (L/min / FiO2): 2  Independent Airway: airway patency satisfactory and stable  Dentition: dentition unchanged  Vital Signs Stable: post-procedure vital signs reviewed and stable  Report to RN Given: handoff report given  Patient transferred to: PACU    Handoff Report: Identifed the Patient, Identified the Reponsible Provider, Reviewed the pertinent medical history, Discussed the surgical course, Reviewed Intra-OP anesthesia mangement and issues during anesthesia, Set expectations for post-procedure period and Allowed opportunity for questions and acknowledgement of understanding      Vitals:  Vitals Value Taken Time   BP     Temp     Pulse     Resp     SpO2         Electronically Signed By: JOSE Aguilar CRNA  September 20, 2023  2:36 PM

## 2023-09-20 NOTE — IR NOTE
Patient Name: Dereck KLEIN [facility]  Medical Record Number: [id number]  Today's Date: 9/20/2023     Procedure: CT liver ablation, liver biopsy  Proceduralist: Dr. Golzarian, Dr. Behzadi   Pathology present: no  Brief done: yes     Procedure Start: 1318  Procedure end: 1415  Sedation medications administered: General anesthesia      Report given to: RN PACU  : N/A     Other Notes: Pt arrived to IR room 1 from . Consent reviewed. Pt denies any questions or concerns regarding procedure. Pt positioned supine and monitored per protocol. Pt tolerated procedure without any noted complications. Pt transferred to PACU.

## 2023-09-20 NOTE — ANESTHESIA PROCEDURE NOTES
Airway       Patient location: IR.       Procedure Start/Stop Times: 9/20/2023 12:37 PM  Staff -        CRNA: Gabriela Jauregui APRN CRNA       Performed By: CRNA  Consent for Airway        Urgency: elective  Indications and Patient Condition       Indications for airway management: isabella-procedural       Induction type:intravenous       Mask difficulty assessment: 2 - vent by mask + OA or adjuvant +/- NMBA    Final Airway Details       Final airway type: endotracheal airway       Successful airway: ETT - single  Endotracheal Airway Details        ETT size (mm): 8.0       Cuffed: yes       Successful intubation technique: direct laryngoscopy       Grade View of Cords: 3       Adjucts: stylet       Position: Right       Measured from: gums/teeth       Secured at (cm): 23       Bite block used: None    Post intubation assessment        Placement verified by: capnometry        Number of attempts at approach: 1       Secured with: pink tape       Ease of procedure: easy       Dentition: Intact and Unchanged    Medication(s) Administered   Medication Administration Time: 9/20/2023 12:37 PM

## 2023-09-20 NOTE — ANESTHESIA POSTPROCEDURE EVALUATION
Patient: Dereck Boston    Procedure: Procedure(s):  ANESTHESIA OUT OF OR COMPUTED TOMOGRAPHY LIVER ABLATION AND LIVER BIOPSY@1100       Anesthesia Type:  MAC    Note:  Disposition: Admission   Postop Pain Control: Uneventful            Sign Out: Well controlled pain   PONV: No   Neuro/Psych: Uneventful            Sign Out: Acceptable/Baseline neuro status   Airway/Respiratory: Uneventful            Sign Out: Acceptable/Baseline resp. status   CV/Hemodynamics: Uneventful            Sign Out: Acceptable CV status; No obvious hypovolemia; No obvious fluid overload   Other NRE: NONE   DID A NON-ROUTINE EVENT OCCUR? No    Event details/Postop Comments:  No complications.           Last vitals:  Vitals Value Taken Time   /78 09/20/23 1500   Temp 36.9  C (98.5  F) 09/20/23 1435   Pulse 85 09/20/23 1504   Resp 16 09/20/23 1500   SpO2 92 % 09/20/23 1504   Vitals shown include unvalidated device data.    Electronically Signed By: Shantanu Meek MD  September 20, 2023  3:06 PM

## 2023-09-21 LAB
PATH REPORT.COMMENTS IMP SPEC: ABNORMAL
PATH REPORT.COMMENTS IMP SPEC: ABNORMAL
PATH REPORT.COMMENTS IMP SPEC: YES
PATH REPORT.FINAL DX SPEC: ABNORMAL
PATH REPORT.GROSS SPEC: ABNORMAL
PATH REPORT.MICROSCOPIC SPEC OTHER STN: ABNORMAL
PATH REPORT.RELEVANT HX SPEC: ABNORMAL
PHOTO IMAGE: ABNORMAL

## 2023-09-21 PROCEDURE — 88307 TISSUE EXAM BY PATHOLOGIST: CPT | Mod: 26 | Performed by: PATHOLOGY

## 2023-09-26 ENCOUNTER — TELEPHONE (OUTPATIENT)
Dept: INTERVENTIONAL RADIOLOGY/VASCULAR | Facility: CLINIC | Age: 68
End: 2023-09-26
Payer: MEDICARE

## 2023-09-26 NOTE — TELEPHONE ENCOUNTER
Spoke with: Dereck  Call attempt: 1  Message left: No  Any pain: No  Any fever: No  Any redness/swelling/ abnormal drainage around puncture site: No  Were you instructed well enough to take care of yourself at home: Yes  Are you satisfied with the care you received: Yes  Any additional concerns or questions: Yes, The patient is curious when he will get results from the biopsy and a new plan for treatment. Will reach out to MD about plan for sharing results.    Post call completed.   September 26, 2023 2:54 PM  Gio Kyle RN

## 2023-10-03 ENCOUNTER — TELEPHONE (OUTPATIENT)
Dept: INTERVENTIONAL RADIOLOGY/VASCULAR | Facility: CLINIC | Age: 68
End: 2023-10-03
Payer: MEDICARE

## 2023-10-03 NOTE — TELEPHONE ENCOUNTER
[facility] Call Center    Phone Message    May a detailed message be left on voicemail: yes     Reason for Call: Other: Pt would like a call to discuss what his next steps are. He was last seen on 9/20/23.     Please call Dereck back at [phone].     Thank you.   Action Taken: Message routed to:  [facility] IR    Travel Screening: Not Applicable

## 2023-10-04 NOTE — TELEPHONE ENCOUNTER
Called and LVM for Pt noting that he needs a visit with Dr Bonilla to discuss results and next steps, hopefully next week. Will have scheduling reach out to coordinate.     Sara Noonan LPN

## 2023-10-06 ENCOUNTER — TELEPHONE (OUTPATIENT)
Dept: GASTROENTEROLOGY | Facility: CLINIC | Age: 68
End: 2023-10-06
Payer: MEDICARE

## 2023-10-10 ENCOUNTER — VIRTUAL VISIT (OUTPATIENT)
Dept: RADIOLOGY | Facility: CLINIC | Age: 68
End: 2023-10-10
Attending: RADIOLOGY
Payer: MEDICARE

## 2023-10-10 VITALS — WEIGHT: 235 LBS | HEIGHT: 67 IN | BODY MASS INDEX: 36.88 KG/M2

## 2023-10-10 DIAGNOSIS — C22.0 HCC (HEPATOCELLULAR CARCINOMA) (H): Primary | ICD-10-CM

## 2023-10-10 PROCEDURE — 99214 OFFICE O/P EST MOD 30 MIN: CPT | Mod: 95 | Performed by: RADIOLOGY

## 2023-10-10 ASSESSMENT — PAIN SCALES - GENERAL: PAINLEVEL: NO PAIN (0)

## 2023-10-10 NOTE — PATIENT INSTRUCTIONS
You were seen today in the IR Clinic by Dr Bonilla for follow up regarding HCC.    Plan:    - We will move forward with planning for chemoembolization. Our scheduling team will contact you to coordinate.     - PRIOR to the procedure, we will have you complete a CT Chest and labs.    Please call or send a MyChart with any questions or concerns.    Sara VARGAS LPN/ Kary REYES, RNCC  [phone]

## 2023-10-10 NOTE — LETTER
10/10/2023         RE: Dereck Boston  [address] [place] [address]        Dear Colleague,    Thank you for referring your patient, Dereck Boston, to the [facility]. Please see a copy of my visit note below.    Virtual Visit Details    Type of service:  Video Visit   Video Start Time: 1:37  Video End Time:1:47    Originating Location (pt. Location): Home  Distant Location (provider location):  Off-site  Platform used for Video Visit: [facility]       Interventional Radiology Clinic:     HPI:        Mr. Boston is a 67 year old man with a history of HTN, HLD, DM, hemochromatosis on phlebotomy, and liver lesion that was biopsied and demonstrated an HCC. At the time of biopsy, the ablation seemed to be risky due to the proximity of the bowel. He is an otherwise healthy patient with ECOG of 0.  I see him today to discuss other liver directed therapies.    I looked at the imaging and labs.    Current Outpatient Medications   Medication    ACCU-CHEK GUIDE test strip    amLODIPine (NORVASC) 10 MG tablet    atorvastatin (LIPITOR) 10 MG tablet    blood glucose monitoring (ACCU-CHEK FASTCLIX) lancets    lisinopril-hydrochlorothiazide (ZESTORETIC) 20-12.5 MG tablet    metFORMIN (GLUCOPHAGE) 1000 MG tablet    study - aspirin, IDS# [id number], 81 mg tablet     No current facility-administered medications for this visit.     Past Medical History:   Diagnosis Date    Diabetes (H)     Hypertension     Sleep apnea        Past Surgical History:   Procedure Laterality Date    IR LIVER BIOPSY PERCUTANEOUS  2023    ORTHOPEDIC SURGERY         No family history on file.    Social History     Tobacco Use    Smoking status: Former     Years: 30.00     Types: Cigarettes     Quit date:      Years since quittin.7    Smokeless tobacco: Never   Substance Use Topics    Alcohol use: Not Currently     Impression and plan:  I have reviewed the risks and benefits of chemoembolization and Radioembolization.  Given the size and location of the tumor, I would prefer to start with the Chemoembolization. I think the patient needs a CT of the chest to rule out any lesions. I also need a new lab including AFP prior to the procedure. The patient agreed with the plan and would like to proceed.         Kia Bonilla MD

## 2023-10-10 NOTE — PROGRESS NOTES
Virtual Visit Details    Type of service:  Video Visit   Video Start Time: 1:37  Video End Time:1:47    Originating Location (pt. Location): Home  Distant Location (provider location):  Off-site  Platform used for Video Visit: [facility]       Interventional Radiology Clinic:     HPI:        Mr. Boston is a 67 year old man with a history of HTN, HLD, DM, hemochromatosis on phlebotomy, and liver lesion that was biopsied and demonstrated an HCC. At the time of biopsy, the ablation seemed to be risky due to the proximity of the bowel. He is an otherwise healthy patient with ECOG of 0.  I see him today to discuss other liver directed therapies.    I looked at the imaging and labs.    Current Outpatient Medications   Medication     ACCU-CHEK GUIDE test strip     amLODIPine (NORVASC) 10 MG tablet     atorvastatin (LIPITOR) 10 MG tablet     blood glucose monitoring (ACCU-CHEK FASTCLIX) lancets     lisinopril-hydrochlorothiazide (ZESTORETIC) 20-12.5 MG tablet     metFORMIN (GLUCOPHAGE) 1000 MG tablet     study - aspirin, IDS# [id number], 81 mg tablet     No current facility-administered medications for this visit.     Past Medical History:   Diagnosis Date     Diabetes (H)      Hypertension      Sleep apnea        Past Surgical History:   Procedure Laterality Date     IR LIVER BIOPSY PERCUTANEOUS  2023     ORTHOPEDIC SURGERY         No family history on file.    Social History     Tobacco Use     Smoking status: Former     Years: 30.00     Types: Cigarettes     Quit date:      Years since quittin.     Smokeless tobacco: Never   Substance Use Topics     Alcohol use: Not Currently     Impression and plan:  I have reviewed the risks and benefits of chemoembolization and Radioembolization. Given the size and location of the tumor, I would prefer to start with the Chemoembolization. I think the patient needs a CT of the chest to rule out any lesions. I also need a new lab including AFP prior to the procedure. The patient  agreed with the plan and would like to proceed.

## 2023-10-12 ENCOUNTER — TELEPHONE (OUTPATIENT)
Dept: VASCULAR SURGERY | Facility: CLINIC | Age: 68
End: 2023-10-12
Payer: MEDICARE

## 2023-10-12 NOTE — TELEPHONE ENCOUNTER
[facility] Call Center    Phone Message    May a detailed message be left on voicemail: yes     Reason for Call:  Patient would like to speak with Sara mercado some tests and referrals. Please call back [phone] Thank you    Action Taken: Message routed to:  [facility]    Travel Screening: Not Applicable

## 2023-10-13 NOTE — TELEPHONE ENCOUNTER
Called and spoke with Pt. Confirmed he would like testing completed at [facility] in [place]. Noted orders will be faxed.     Sara Noonan LPN

## 2023-10-16 ENCOUNTER — VIRTUAL VISIT (OUTPATIENT)
Dept: GASTROENTEROLOGY | Facility: CLINIC | Age: 68
End: 2023-10-16
Attending: STUDENT IN AN ORGANIZED HEALTH CARE EDUCATION/TRAINING PROGRAM
Payer: MEDICARE

## 2023-10-16 DIAGNOSIS — K74.60 CIRRHOSIS OF LIVER WITHOUT ASCITES, UNSPECIFIED HEPATIC CIRRHOSIS TYPE (H): ICD-10-CM

## 2023-10-16 DIAGNOSIS — E83.119 HEMOCHROMATOSIS, UNSPECIFIED HEMOCHROMATOSIS TYPE: Primary | ICD-10-CM

## 2023-10-16 DIAGNOSIS — C22.0 HEPATOCELLULAR CARCINOMA (H): ICD-10-CM

## 2023-10-16 PROCEDURE — 99215 OFFICE O/P EST HI 40 MIN: CPT | Mod: 95 | Performed by: STUDENT IN AN ORGANIZED HEALTH CARE EDUCATION/TRAINING PROGRAM

## 2023-10-16 ASSESSMENT — PAIN SCALES - GENERAL: PAINLEVEL: NO PAIN (0)

## 2023-10-16 NOTE — PROGRESS NOTES
Video visit  Start time: 3:30 PM  End time 4:00 PM  Patient at home   Provider on site  cameron    [facility] Return patient Visit    Date of Visit: October 16, 2023    Reason for referral: Cirrhosis and HCC    Subjective: Mr. Boston is a 68 year old man with a history of HTN, HLD, DM, hemochromatosis on phlebotomy, who presents for evaluation of cirrhosis and a liver lesion seen on MRI, now found to be unifocal HCC    Initial History:    He was diagnosed with hemochromatosis years ago after his brother was. 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 Gets it every 2-3 months and his ferritin has been > 50. He has had imaging in the past that has showed hepatomegaly and steatosis, without signs of cirrhosis. He has never had a liver bx, has not followed with hepatology or GI.     He had a RUQ US 5/2023 that showed a mass in the right lobe of the liver with background cirrhosis. This was followed up by a liver MRI that showed LR 4 lesion in the right lobe of the liver. Also showed cirrhosis with evidence of regenerative nodules. No ascites. AFP normal.     He reports he stopped drinking spring 2023 was drinking 5 beers/week. No history of treatment or DUIs.     Has a good functional status, can walk 1/2 mile several times a day    Interval Events:  -MRI 8/15 showing 2.4 cm LR4 lesion - stable since 6/5/2023. Also showed prominent and mildly enlarged portal hepatic lymph nodes, recommend attention on follow up. Underwent liver bx of LR 4 lesion 9/2023 - showing well differentiated HCC in the background of cirrhosis. Ablation not performed due to adjacent bowel that did not separate with hydro dissection. Plan for TACE 11/1/2023  -He feels well, would like to avoid liver transplant if possible  -Mentions he was told he had viral hepatitis attentionally hepatitis B in the past has never undergone treatment for this.  Cannot find any testing of this  -No signs of liver  decompensation    ROS: 14 point ROS negative except for positives noted in HPI.    PMHx:  DM  HTN  HLD  No known history of heart disease  No personal history of cancer    PSHx:  Ankle surgery for fracture  R parotid grand resection due to enlargement - thought it was cancer but it wasn't    FamHx:  No family history of liver disease, liver cancer  Brother with HC  Other brother had a liver transplant for ARLD  CVA    SocHx:  Social History     Socioeconomic History    Marital status:      Spouse name: Not on file    Number of children: Not on file    Years of education: Not on file    Highest education level: Not on file   Occupational History    Not on file   Tobacco Use    Smoking status: Former     Years: 30     Types: Cigarettes     Quit date:      Years since quittin.8    Smokeless tobacco: Never   Vaping Use    Vaping Use: Never used   Substance and Sexual Activity    Alcohol use: Not Currently    Drug use: Not Currently    Sexual activity: Not on file   Other Topics Concern    Not on file   Social History Narrative    Not on file     Social Determinants of Health     Financial Resource Strain: Not on file   Food Insecurity: Not on file   Transportation Needs: Not on file   Physical Activity: Not on file   Stress: Not on file   Social Connections: Not on file   Interpersonal Safety: Not on file   Housing Stability: Not on file   Last drink 3 months ago, would drink 5 beers/week, denies history of DUI  YOSSI - Felicity helps with care  Retired - previously worked as a  for [place]    Medications:  Current Outpatient Medications   Medication    ACCU-CHEK GUIDE test strip    amLODIPine (NORVASC) 10 MG tablet    atorvastatin (LIPITOR) 10 MG tablet    blood glucose monitoring (ACCU-CHEK FASTCLIX) lancets    lisinopril-hydrochlorothiazide (ZESTORETIC) 20-12.5 MG tablet    metFORMIN (GLUCOPHAGE) 1000 MG tablet    study - aspirin, IDS# [id number], 81 mg  tablet     No current facility-administered medications for this visit.     No OTCs, herbals    Allergies:  Allergies   Allergen Reactions    Atorvastatin Other (See Comments)     Other reaction(s): *Unknown  Abnormal Liver Functions, ( is taking this at a small dose)  Abnormal Liver Functions, ( is taking this at a small dose)      Penicillins Other (See Comments) and Unknown     Other reaction(s): *Unknown - Childhood Rxn         Objective:  There were no vitals taken for this visit.  Constitutional: pleasant man in NAD  Eyes: non icteric  Respiratory: Normal respiratory excursion   MSK: normal range of motion of visualized extremities  Abd: Non distended  Skin: No jaundice  Psychiatric: normal mood and orientation    Labs: Reviewed in EHR, no recent AFP    TBR 1.2   Platelets 139,000    Imaging: Reviewed in EHR    Independently reviewed labs and imaging.     Assessment/Plan: Mr. Boston is a 68 year old man with a history of HTN, HLD, DM, hemochromatosis on phlebotomy, who presents for evaluation of cirrhosis and a liver lesion seen on MRI, found to be biopsy-proven HCC.    Attempted ablation, was not done due to concern about adjacent bowel injury. IR is planning for TACE November 1.  Needs updated CT scan that he would like to get done at [facility].    He mentions he was told he had viral hepatitis, I do not have any evidence of this, recommend testing for hepatitis A, B, and C at his local [place] clinic.  He is getting labs there soon.    Discussed treatment of his HCC with watchful waiting versus starting liver transplant evaluation.  He has a T2 liver lesion and would meet criteria for meld exception points after 6 months on the list, however he would also likely qualify for immediate upgrade if he has recurrence after 6 months.  He is hesitant to get a liver transplant would like to avoid if possible.  Discussed that his transplant work-up would include cardiac testing it is possible that we could find coronary  disease that would delay his work-up..  He reports having a colonoscopy 2019 that was normal and was told to repeat in 10 years.    We will plan to follow-up his post TACE imaging and if there is any sign for progressive disease, would start liver transplant evaluation.  He will also think more about this.    RTC 3 months.    Mary Mueller MD MS  Hepatology/Liver Transplant  [facility]    Approximately 30 minutes was spent for the visit with 30 minutes of non face-to-face time were spent in review of the patient's medical record on the day of the visit. This included review of previous: clinic visits, hospital records, lab results, imaging studies, and documentation.  The findings from this review are summarized in the above note.

## 2023-10-16 NOTE — NURSING NOTE
Chief Complaint   Patient presents with    Follow Up     3 month follow up for liver lesion, right lobe, and hemochromatosis.       He requests these members of his care team be copied on today's visit information:  PCP: Kt Espinoza    There were no vitals filed for this visit.  There is no height or weight on file to calculate BMI.    Medications were reconciled.    Does patient need any medication refills at today's visit? None        Maria G Jimenez, [facility]

## 2023-10-17 ENCOUNTER — TELEPHONE (OUTPATIENT)
Dept: GASTROENTEROLOGY | Facility: CLINIC | Age: 68
End: 2023-10-17
Payer: MEDICARE

## 2023-10-17 DIAGNOSIS — C22.0 HEPATOCELLULAR CARCINOMA (H): ICD-10-CM

## 2023-10-17 DIAGNOSIS — R79.89 ELEVATED LFTS: Primary | ICD-10-CM

## 2023-10-17 NOTE — TELEPHONE ENCOUNTER
All orders sent to patient requested location.    Lila DEL CID LPN  Hepatology Clinic     ----- Message -----  From: Mary Mueller MD  Sent: 10/16/2023   4:41 PM CDT  To: [facility]    Hello -     Can you send the following labs (Cbc, bmp, lfts, INR, hepatitis b sag, hepatitis b core total antibody, hepatitis b surface antibody, hepatitis c ab, hepatitis C rna, hepatitis A Igg) and CT chest (indication: HCC screening) to    Ralf Christy - (radiology fax is [phone])     Thank you

## 2023-10-18 ENCOUNTER — TELEPHONE (OUTPATIENT)
Dept: GASTROENTEROLOGY | Facility: CLINIC | Age: 68
End: 2023-10-18
Payer: MEDICARE

## 2023-10-18 NOTE — TELEPHONE ENCOUNTER
Call back to [facility]. Updated imaging that per Dr. Mueller patient does not need Contrast with his CT.     Lila DEL CID LPN  Hepatology Clinic     --------  Mary Mueller MD Beckenbach, Shannon, LPN  Caller: Unspecified (Yesterday, 12:34 PM)  He does not need contrast with this chest CT    --------  [facility] Call Center    Phone Message    May a detailed message be left on voicemail: yes     Reason for Call: Other: [facility] called regarding orders for CT. They are asking if the pt can have CT with contrast? If so, a new order must be placed. FAX: [phone]     Action Taken: Message routed to:  [facility] hepatology     Travel Screening: Not Applicable

## 2023-11-01 ENCOUNTER — APPOINTMENT (OUTPATIENT)
Dept: MEDSURG UNIT | Facility: CLINIC | Age: 68
End: 2023-11-01
Attending: RADIOLOGY
Payer: MEDICARE

## 2023-11-01 ENCOUNTER — APPOINTMENT (OUTPATIENT)
Dept: INTERVENTIONAL RADIOLOGY/VASCULAR | Facility: CLINIC | Age: 68
End: 2023-11-01
Attending: RADIOLOGY
Payer: MEDICARE

## 2023-11-01 ENCOUNTER — HOSPITAL ENCOUNTER (OUTPATIENT)
Facility: CLINIC | Age: 68
Discharge: HOME | End: 2023-11-01
Attending: RADIOLOGY | Admitting: RADIOLOGY
Payer: MEDICARE

## 2023-11-01 VITALS
HEART RATE: 80 BPM | OXYGEN SATURATION: 95 % | HEIGHT: 67 IN | DIASTOLIC BLOOD PRESSURE: 95 MMHG | TEMPERATURE: 97.9 F | SYSTOLIC BLOOD PRESSURE: 156 MMHG | WEIGHT: 225 LBS | RESPIRATION RATE: 16 BRPM | BODY MASS INDEX: 35.31 KG/M2

## 2023-11-01 DIAGNOSIS — C22.0 HCC (HEPATOCELLULAR CARCINOMA) (H): ICD-10-CM

## 2023-11-01 LAB
ALBUMIN SERPL BCG-MCNC: 4.4 G/DL (ref 3.5–5.2)
ALP SERPL-CCNC: 78 U/L (ref 40–129)
ALT SERPL W P-5'-P-CCNC: 32 U/L (ref 0–70)
ANION GAP SERPL CALCULATED.3IONS-SCNC: 13 MMOL/L (ref 7–15)
AST SERPL W P-5'-P-CCNC: 35 U/L (ref 0–45)
BILIRUB DIRECT SERPL-MCNC: 0.25 MG/DL (ref 0–0.3)
BILIRUB SERPL-MCNC: 1 MG/DL
BUN SERPL-MCNC: 13.7 MG/DL (ref 8–23)
CALCIUM SERPL-MCNC: 9.7 MG/DL (ref 8.8–10.2)
CHLORIDE SERPL-SCNC: 104 MMOL/L (ref 98–107)
CREAT SERPL-MCNC: 0.77 MG/DL (ref 0.67–1.17)
DEPRECATED HCO3 PLAS-SCNC: 22 MMOL/L (ref 22–29)
EGFRCR SERPLBLD CKD-EPI 2021: >90 ML/MIN/1.73M2
ERYTHROCYTE [DISTWIDTH] IN BLOOD BY AUTOMATED COUNT: 12.6 % (ref 10–15)
GLUCOSE SERPL-MCNC: 155 MG/DL (ref 70–99)
HCT VFR BLD AUTO: 41.3 % (ref 40–53)
HGB BLD-MCNC: 14.6 G/DL (ref 13.3–17.7)
INR PPP: 1.15 (ref 0.85–1.15)
MCH RBC QN AUTO: 31.9 PG (ref 26.5–33)
MCHC RBC AUTO-ENTMCNC: 35.4 G/DL (ref 31.5–36.5)
MCV RBC AUTO: 90 FL (ref 78–100)
PLATELET # BLD AUTO: 160 10E3/UL (ref 150–450)
POTASSIUM SERPL-SCNC: 4.2 MMOL/L (ref 3.4–5.3)
PROT SERPL-MCNC: 7.5 G/DL (ref 6.4–8.3)
RBC # BLD AUTO: 4.57 10E6/UL (ref 4.4–5.9)
SODIUM SERPL-SCNC: 139 MMOL/L (ref 135–145)
WBC # BLD AUTO: 6.3 10E3/UL (ref 4–11)

## 2023-11-01 PROCEDURE — 272N000192 HC ACCESSORY CR2

## 2023-11-01 PROCEDURE — 255N000002 HC RX 255 OP 636: Performed by: RADIOLOGY

## 2023-11-01 PROCEDURE — 99152 MOD SED SAME PHYS/QHP 5/>YRS: CPT

## 2023-11-01 PROCEDURE — 250N000009 HC RX 250: Performed by: RADIOLOGY

## 2023-11-01 PROCEDURE — 85610 PROTHROMBIN TIME: CPT | Performed by: NURSE PRACTITIONER

## 2023-11-01 PROCEDURE — 85014 HEMATOCRIT: CPT | Performed by: NURSE PRACTITIONER

## 2023-11-01 PROCEDURE — C1769 GUIDE WIRE: HCPCS

## 2023-11-01 PROCEDURE — 76937 US GUIDE VASCULAR ACCESS: CPT

## 2023-11-01 PROCEDURE — 99152 MOD SED SAME PHYS/QHP 5/>YRS: CPT | Mod: GC | Performed by: RADIOLOGY

## 2023-11-01 PROCEDURE — 75774 ARTERY X-RAY EACH VESSEL: CPT | Mod: XU

## 2023-11-01 PROCEDURE — 37243 VASC EMBOLIZE/OCCLUDE ORGAN: CPT | Mod: GC | Performed by: RADIOLOGY

## 2023-11-01 PROCEDURE — C1887 CATHETER, GUIDING: HCPCS

## 2023-11-01 PROCEDURE — 999N000142 HC STATISTIC PROCEDURE PREP ONLY

## 2023-11-01 PROCEDURE — 36247 INS CATH ABD/L-EXT ART 3RD: CPT | Mod: GC | Performed by: RADIOLOGY

## 2023-11-01 PROCEDURE — 250N000011 HC RX IP 250 OP 636: Mod: JZ | Performed by: NURSE PRACTITIONER

## 2023-11-01 PROCEDURE — 272N000504 HC NEEDLE CR4

## 2023-11-01 PROCEDURE — 37243 VASC EMBOLIZE/OCCLUDE ORGAN: CPT

## 2023-11-01 PROCEDURE — 258N000003 HC RX IP 258 OP 636: Performed by: NURSE PRACTITIONER

## 2023-11-01 PROCEDURE — 80053 COMPREHEN METABOLIC PANEL: CPT | Performed by: NURSE PRACTITIONER

## 2023-11-01 PROCEDURE — 999N000054 HC STATISTIC EKG NON-CHARGEABLE

## 2023-11-01 PROCEDURE — 96420 CHEMO IA PUSH TECNIQUE: CPT

## 2023-11-01 PROCEDURE — 272N000143 HC KIT CR3

## 2023-11-01 PROCEDURE — 999N000133 HC STATISTIC PP CARE STAGE 2

## 2023-11-01 PROCEDURE — 76377 3D RENDER W/INTRP POSTPROCES: CPT | Mod: 26 | Performed by: RADIOLOGY

## 2023-11-01 PROCEDURE — 93005 ELECTROCARDIOGRAM TRACING: CPT

## 2023-11-01 PROCEDURE — 250N000011 HC RX IP 250 OP 636: Mod: JZ | Performed by: RADIOLOGY

## 2023-11-01 PROCEDURE — 82248 BILIRUBIN DIRECT: CPT | Performed by: NURSE PRACTITIONER

## 2023-11-01 PROCEDURE — C1889 IMPLANT/INSERT DEVICE, NOC: HCPCS

## 2023-11-01 PROCEDURE — 250N000009 HC RX 250: Performed by: NURSE PRACTITIONER

## 2023-11-01 PROCEDURE — 76937 US GUIDE VASCULAR ACCESS: CPT | Mod: 26 | Performed by: RADIOLOGY

## 2023-11-01 PROCEDURE — 36247 INS CATH ABD/L-EXT ART 3RD: CPT

## 2023-11-01 PROCEDURE — 36415 COLL VENOUS BLD VENIPUNCTURE: CPT | Performed by: NURSE PRACTITIONER

## 2023-11-01 PROCEDURE — 75726 ARTERY X-RAYS ABDOMEN: CPT

## 2023-11-01 PROCEDURE — 36248 INS CATH ABD/L-EXT ART ADDL: CPT

## 2023-11-01 RX ORDER — NALOXONE HYDROCHLORIDE 0.4 MG/ML
0.4 INJECTION, SOLUTION INTRAMUSCULAR; INTRAVENOUS; SUBCUTANEOUS
Status: DISCONTINUED | OUTPATIENT
Start: 2023-11-01 | End: 2023-11-01

## 2023-11-01 RX ORDER — ONDANSETRON 2 MG/ML
4 INJECTION INTRAMUSCULAR; INTRAVENOUS ONCE
Status: COMPLETED | OUTPATIENT
Start: 2023-11-01 | End: 2023-11-01

## 2023-11-01 RX ORDER — LIDOCAINE 40 MG/G
CREAM TOPICAL
Status: DISCONTINUED | OUTPATIENT
Start: 2023-11-01 | End: 2023-11-01

## 2023-11-01 RX ORDER — FLUMAZENIL 0.1 MG/ML
0.2 INJECTION, SOLUTION INTRAVENOUS
Status: DISCONTINUED | OUTPATIENT
Start: 2023-11-01 | End: 2023-11-01

## 2023-11-01 RX ORDER — SCOLOPAMINE TRANSDERMAL SYSTEM 1 MG/1
1 PATCH, EXTENDED RELEASE TRANSDERMAL ONCE
Status: DISCONTINUED | OUTPATIENT
Start: 2023-11-01 | End: 2023-11-01

## 2023-11-01 RX ORDER — HEPARIN SODIUM 200 [USP'U]/100ML
1 INJECTION, SOLUTION INTRAVENOUS CONTINUOUS PRN
Status: DISCONTINUED | OUTPATIENT
Start: 2023-11-01 | End: 2023-11-01

## 2023-11-01 RX ORDER — DEXTROSE MONOHYDRATE 25 G/50ML
25-50 INJECTION, SOLUTION INTRAVENOUS
Status: DISCONTINUED | OUTPATIENT
Start: 2023-11-01 | End: 2023-11-01

## 2023-11-01 RX ORDER — AMPICILLIN AND SULBACTAM 2; 1 G/1; G/1
3 INJECTION, POWDER, FOR SOLUTION INTRAMUSCULAR; INTRAVENOUS
Status: COMPLETED | OUTPATIENT
Start: 2023-11-01 | End: 2023-11-01

## 2023-11-01 RX ORDER — OXYCODONE HYDROCHLORIDE 5 MG/1
5 TABLET ORAL EVERY 6 HOURS PRN
Qty: 10 TABLET | Refills: 0 | Status: SHIPPED | OUTPATIENT
Start: 2023-11-01 | End: 2023-11-04

## 2023-11-01 RX ORDER — NALOXONE HYDROCHLORIDE 0.4 MG/ML
0.2 INJECTION, SOLUTION INTRAMUSCULAR; INTRAVENOUS; SUBCUTANEOUS
Status: DISCONTINUED | OUTPATIENT
Start: 2023-11-01 | End: 2023-11-01

## 2023-11-01 RX ORDER — IODIXANOL 320 MG/ML
100 INJECTION, SOLUTION INTRAVASCULAR ONCE
Status: COMPLETED | OUTPATIENT
Start: 2023-11-01 | End: 2023-11-01

## 2023-11-01 RX ORDER — FENTANYL CITRATE 50 UG/ML
25-50 INJECTION, SOLUTION INTRAMUSCULAR; INTRAVENOUS EVERY 5 MIN PRN
Status: DISCONTINUED | OUTPATIENT
Start: 2023-11-01 | End: 2023-11-01

## 2023-11-01 RX ORDER — SODIUM CHLORIDE 9 MG/ML
INJECTION, SOLUTION INTRAVENOUS CONTINUOUS
Status: DISCONTINUED | OUTPATIENT
Start: 2023-11-01 | End: 2023-11-01

## 2023-11-01 RX ORDER — NICOTINE POLACRILEX 4 MG
15-30 LOZENGE BUCCAL
Status: DISCONTINUED | OUTPATIENT
Start: 2023-11-01 | End: 2023-11-01

## 2023-11-01 RX ADMIN — SODIUM CHLORIDE 500 ML: 9 INJECTION, SOLUTION INTRAVENOUS at 11:57

## 2023-11-01 RX ADMIN — ONDANSETRON 4 MG: 2 INJECTION INTRAMUSCULAR; INTRAVENOUS at 13:32

## 2023-11-01 RX ADMIN — HEPARIN SODIUM 2 BAG: 200 INJECTION, SOLUTION INTRAVENOUS at 13:36

## 2023-11-01 RX ADMIN — MIDAZOLAM 0.5 MG: 1 INJECTION INTRAMUSCULAR; INTRAVENOUS at 13:43

## 2023-11-01 RX ADMIN — FENTANYL CITRATE 50 MCG: 50 INJECTION, SOLUTION INTRAMUSCULAR; INTRAVENOUS at 13:25

## 2023-11-01 RX ADMIN — LIDOCAINE HYDROCHLORIDE 7 ML: 10 INJECTION, SOLUTION EPIDURAL; INFILTRATION; INTRACAUDAL; PERINEURAL at 13:35

## 2023-11-01 RX ADMIN — IODIXANOL 100 ML: 320 INJECTION, SOLUTION INTRAVASCULAR at 14:38

## 2023-11-01 RX ADMIN — MIDAZOLAM 0.5 MG: 1 INJECTION INTRAMUSCULAR; INTRAVENOUS at 13:32

## 2023-11-01 RX ADMIN — FENTANYL CITRATE 25 MCG: 50 INJECTION, SOLUTION INTRAMUSCULAR; INTRAVENOUS at 13:32

## 2023-11-01 RX ADMIN — AMPICILLIN SODIUM AND SULBACTAM SODIUM 3 G: 2; 1 INJECTION, POWDER, FOR SOLUTION INTRAMUSCULAR; INTRAVENOUS at 12:00

## 2023-11-01 RX ADMIN — FENTANYL CITRATE 25 MCG: 50 INJECTION, SOLUTION INTRAMUSCULAR; INTRAVENOUS at 13:43

## 2023-11-01 RX ADMIN — FENTANYL CITRATE 25 MCG: 50 INJECTION, SOLUTION INTRAMUSCULAR; INTRAVENOUS at 14:00

## 2023-11-01 RX ADMIN — FENTANYL CITRATE 50 MCG: 50 INJECTION, SOLUTION INTRAMUSCULAR; INTRAVENOUS at 14:05

## 2023-11-01 RX ADMIN — MIDAZOLAM 0.5 MG: 1 INJECTION INTRAMUSCULAR; INTRAVENOUS at 14:19

## 2023-11-01 RX ADMIN — HYDROCORTISONE SODIUM SUCCINATE 100 MG: 100 INJECTION, POWDER, FOR SOLUTION INTRAMUSCULAR; INTRAVENOUS at 11:59

## 2023-11-01 RX ADMIN — MITOMYCIN: 5 INJECTION, POWDER, LYOPHILIZED, FOR SOLUTION INTRAVENOUS at 13:47

## 2023-11-01 RX ADMIN — SCOPALAMINE 1 PATCH: 1 PATCH, EXTENDED RELEASE TRANSDERMAL at 11:59

## 2023-11-01 RX ADMIN — MIDAZOLAM 0.5 MG: 1 INJECTION INTRAMUSCULAR; INTRAVENOUS at 14:00

## 2023-11-01 RX ADMIN — MIDAZOLAM 1 MG: 1 INJECTION INTRAMUSCULAR; INTRAVENOUS at 13:26

## 2023-11-01 ASSESSMENT — ACTIVITIES OF DAILY LIVING (ADL)
ADLS_ACUITY_SCORE: 35

## 2023-11-01 NOTE — PROCEDURES
[facility]    Procedure: IR Procedure Note    Date/Time: 11/1/2023 2:30 PM    Performed by: Thomas Bustos MD  Authorized by: Kia Bonilla MD      UNIVERSAL PROTOCOL   Site Marked: NA  Prior Images Obtained and Reviewed:  Yes  Required items: Required blood products, implants, devices and special equipment available    Patient identity confirmed:  Verbally with patient, arm band, provided demographic data and hospital-assigned identification number  Patient was reevaluated immediately before administering moderate or deep sedation or anesthesia  Confirmation Checklist:  Patient's identity using two indicators, relevant allergies, procedure was appropriate and matched the consent or emergent situation and correct equipment/implants were available  Time out: Immediately prior to the procedure a time out was called    Universal Protocol: the Joint Commission Universal Protocol was followed    Preparation: Patient was prepped and draped in usual sterile fashion       ANESTHESIA    Anesthesia: Local infiltration  Local Anesthetic:  Lidocaine 1% without epinephrine    See dictated procedure note for full details.  Findings: TACE of r hepatic lobe lesion    Specimens: none    Complications: None    Condition: Stable    Plan: TACE of r hepatic lobe lesion      PROCEDURE  Describe Procedure: TACE of r hepatic lobe lesion  Patient Tolerance:  Patient tolerated the procedure well with no immediate complications  Length of time physician/provider present for 1:1 monitoring during sedation: 45

## 2023-11-01 NOTE — IR NOTE
Patient Name: Dereck Vinsonple  Medical Record Number: [id number]  Today's Date: 11/1/2023    Procedure: transarterial chemoembolization  Proceduralist: MD Bonilla, MD Bustos  Pathology present: N/A  Brief completed: N/A    Procedure Start: 1532  Procedure end: 1430  Sedation medications administered: 3 mg of versed, 175 mcg of fentanyl  Sedation time:  65 minutes (9116-2876)  Other: 4 mg of IV ondansetron    Report given to: Elizabeth TINAJERO RN  : N/A    Right groin site accessed at 1337. Right groin site de-accessed at 1424 using Angio-Seal for closure - flat bedrest x 2 hours, till 1630.    Other Notes: Pt arrived to IR room 01 from . Consent reviewed. Pt denies any questions or concerns regarding procedure. Pt positioned supine and monitored per protocol. Pt tolerated procedure without any noted complications. Pt transferred back to .

## 2023-11-01 NOTE — PRE-PROCEDURE
GENERAL PRE-PROCEDURE:   Procedure:  TACE  Date/Time:  11/1/2023 12:51 PM    Verbal consent obtained?: Yes    Written consent obtained?: Yes    Risks and benefits: Risks, benefits and alternatives were discussed    Consent given by:  Patient  Patient states understanding of procedure being performed: Yes    Patient's understanding of procedure matches consent: Yes    Procedure consent matches procedure scheduled: Yes    Expected level of sedation:  Moderate  Appropriately NPO:  Yes  Mallampati  :  Grade 3- soft palate visible, posterior pharyngeal wall not visible  Lungs:  Lungs clear with good breath sounds bilaterally  Heart:  Normal heart sounds and rate  History & Physical reviewed:  History and physical reviewed and no updates needed  Statement of review:  I have reviewed the lab findings, diagnostic data, medications, and the plan for sedation

## 2023-11-01 NOTE — PROGRESS NOTES
Pt returned following IR procedure. Flat bedrest 2 hours, resting comfortably at this time. Food and fluids offered. Brother Amberly called and updated. No further needs.

## 2023-11-01 NOTE — DISCHARGE INSTRUCTIONS
[facility]   Interventional Radiology  Discharge Instructions Post Angiography for Transarterial Chemoembolization (TACE)    AFTER YOU GO HOME          Relax and take it easy for 24 hours.       Drink plenty of fluids. Resume your regular diet       DO NOT smoke for at least 24 hours, if you were given any sedation.       DO NOT drink alcoholic beverages the day of your procedure.       DO NOT drive or operate machinery at home or at work for 24 hours.          DO NOT make any important or legal decisions for 24 hours following your procedure.       DO NOT take a shower for at least 12 hours following your procedure. No tub bath, hot tubs, or swimming for 5 days      Care of groin site  It is normal to have a small bruise or lump at the site.  Do not use lotion or powder near the puncture site for 3 days.  Do NOT lift more than 10 pounds or do any strenuous exercise for at least 3 to 5 days.  For the first 2 days, when you cough, sneeze or move your bowels, hold your hand over the puncture site and press gently.    If you start bleeding from the site in your groin: lie down flat and press firmly on the site for 5-10 minutes. Call your doctor as soon as you can.     Call 911 right away if you have: Heavy bleeding, bleeding that does not stop.    CALL THE PHYSICIAN IF:       - You start bleeding from the procedure site. A small lump or bruise is common at the puncture site. Your physician will tell you if you need to return to the hospital.      - You develop numbness, coolness or a change in color of the leg that was punctured.      - You experience increased pain or redness at the puncture site.      - You develop hives or a rash or unexplained itching.      - You develop a temperature of 101 degrees F or greater.    Closure Device: Angioseal          [facility] INTERVENTIONAL RADIOLOGY DEPARTMENT         Procedure Physician:Dr Bonilla and Dr Bustos                              Date of  Procedure:November 1, 2023       Telephone Numbers:   [phone]      Monday-Friday 7:30 am to 4:00 pm                                        [phone]     After 4:00 pm Monday-Friday, Weekend and Holidays. Ask for the Interventional Radiologist on call. Someone is on call 24 hrs/day.

## 2023-11-02 LAB
ATRIAL RATE - MUSE: 76 BPM
DIASTOLIC BLOOD PRESSURE - MUSE: NORMAL MMHG
INTERPRETATION ECG - MUSE: NORMAL
P AXIS - MUSE: 6 DEGREES
PR INTERVAL - MUSE: 208 MS
QRS DURATION - MUSE: 96 MS
QT - MUSE: 392 MS
QTC - MUSE: 441 MS
R AXIS - MUSE: -31 DEGREES
SYSTOLIC BLOOD PRESSURE - MUSE: NORMAL MMHG
T AXIS - MUSE: 9 DEGREES
VENTRICULAR RATE- MUSE: 76 BPM

## 2023-11-03 ENCOUNTER — TELEPHONE (OUTPATIENT)
Dept: INTERVENTIONAL RADIOLOGY/VASCULAR | Facility: CLINIC | Age: 68
End: 2023-11-03
Payer: MEDICARE

## 2023-11-03 DIAGNOSIS — C22.0 HCC (HEPATOCELLULAR CARCINOMA) (H): Primary | ICD-10-CM

## 2023-11-03 NOTE — TELEPHONE ENCOUNTER
Spoke with: Dereck  Call attempt: 1  Message left: No  Any pain: Yes, some soreness. Only needed one oxycodone.  Any fever: No  Any redness/swelling/ abnormal drainage around puncture site: No  Were you instructed well enough to take care of yourself at home: Yes  Are you satisfied with the care you received: Yes  Any additional concerns or questions: Yes, denies nausea and vomiting. Only real negative impact is that patient reports fatigue. He will call if that worsens.    Dereck is agreeable to follow-up labs, MRI, and virtual visit with Dr. Bonilla.    Post call completed.   November 3, 2023 10:36 AM

## 2023-11-16 NOTE — ADDENDUM NOTE
Addendum  created 11/16/23 1618 by Shantanu Meek MD    Attestation recorded in Intraprocedure, Intraprocedure Attestations filed

## 2023-12-04 ENCOUNTER — VIRTUAL VISIT (OUTPATIENT)
Dept: RADIOLOGY | Facility: CLINIC | Age: 68
End: 2023-12-04
Attending: STUDENT IN AN ORGANIZED HEALTH CARE EDUCATION/TRAINING PROGRAM
Payer: MEDICARE

## 2023-12-04 VITALS — WEIGHT: 232 LBS | BODY MASS INDEX: 36.41 KG/M2 | HEIGHT: 67 IN

## 2023-12-04 DIAGNOSIS — C22.0 HCC (HEPATOCELLULAR CARCINOMA) (H): Primary | ICD-10-CM

## 2023-12-04 PROCEDURE — 99213 OFFICE O/P EST LOW 20 MIN: CPT | Mod: 95 | Performed by: STUDENT IN AN ORGANIZED HEALTH CARE EDUCATION/TRAINING PROGRAM

## 2023-12-04 ASSESSMENT — PAIN SCALES - GENERAL: PAINLEVEL: NO PAIN (0)

## 2023-12-04 NOTE — NURSING NOTE
Is the patient currently in the state of [place]? YES    Visit mode:VIDEO    If the visit is dropped, the patient can be reconnected by: VIDEO VISIT: Send to e-mail at: [email or web address]    Will anyone else be joining the visit? NO  (If patient encounters technical issues they should call [phone]    How would you like to obtain your AVS? MyChart    Are changes needed to the allergy or medication list? No    Reason for visit: Video Visit (Follow UP )    Latesha KENDRICK

## 2023-12-04 NOTE — LETTER
12/4/2023         RE: Dereck Boston  [id number] [place] [address]        Dear Colleague,    Thank you for referring your patient, Dereck Boston, to the [facility]. Please see a copy of my visit note below.            INTERVENTIONAL RADIOLOGY ESTABLISHED PATIENT FOLLOW UP      HPI: Dereck Boston is a 68 year old man with a history of HTN, HLD, DM, hemochromatosis, and cirrhosis found to have an inferior right hepatic lobe liver lesion that was biopsied 9/20/23 and demonstrated an HCC. He was initially seen by my colleague Dr. Bonilla and with a plan for MWA at the time of biopsy but due to unfavorable anatomy were unable to proceed. He subsequently underwent TACE of the inferior right hepatic lobe lesion on 11/1/23 with Dr. Bonilla. His care has been transferred to me with Dr. Bonilla leaving the practice. He is here for one month follow up.    Postprocedurally he had some pain and fatigue for a couple weeks. This has since resolved and he feels back to normal. No groin hematoma or pain after the procedure.    ROS:  Negative unless otherwise stated in HPI.    Physical Examination:   VITALS:   There were no vitals taken for this visit.    CONSTITUTIONAL: healthy, alert and no distress.   PSYCHIATRIC: mentation appears normal and affect normal.   NEURO: Normal movements and speech.   RESP: No audible cough or wheeze.     Labs:    BMP RESULTS:  Lab Results   Component Value Date     11/01/2023    POTASSIUM 4.2 11/01/2023    CHLORIDE 104 11/01/2023    CO2 22 11/01/2023    ANIONGAP 13 11/01/2023     (H) 11/01/2023     (H) 09/20/2023    BUN 13.7 11/01/2023    CR 0.77 11/01/2023    GFRESTIMATED >90 11/01/2023    MANI 9.7 11/01/2023        CBC RESULTS:  Lab Results   Component Value Date    WBC 6.3 11/01/2023    RBC 4.57 11/01/2023    HGB 14.6 11/01/2023    HCT 41.3 11/01/2023    MCV 90 11/01/2023    MCH 31.9 11/01/2023    MCHC 35.4 11/01/2023    RDW 12.6  11/01/2023     11/01/2023       INR/PTT:  Lab Results   Component Value Date    INR 1.15 11/01/2023       Diagnostic studies: Personally reviewed and interpreted.  MRI 11/30/23 - Stable size of 22 x 23 mm partially exophytic lesion in the inferior right hepatic lobe is isointense to surrounding liver parenchyma on arterial phase, hypointense to parenchyma on portal venous phase with peripheral enhancement. Unfortunately there are not subtraction images available for review. On the arterial phase, it is difficult to determine whether the lesion is enhancing isointense to the liver or if there is just minimal liver enhancement due to timing.    Assessment   Dereck Boston is a 68 year old who is roughly one month s/p TACE of a partially exophytic biopsy proven HCC in the inferior right hepatic lobe, performed by Dr. Bonilla. He had some initial pain and fatigue the first few weeks postprocedurally but he now feels back to normal. Follow up MRI performed 11/30/23 is indeterminant due to equivocal internal enhancement of the lesion on arterial phase. At this point, based on the imaging, would recommend obtaining repeat imaging in 3 months rather then pursue another treatment at this time.     We briefly discussed that if there is evidence of residual/recurrent disease in 3 months we would likely recommend selective internal radiotherapy (Y90), but will assess at that time.     Plan   - Follow up in clinic in 3 months and with repeat Liver MR to be performed prior to visit.    Physician Attestation  I, Kt Wilkinson MD, saw this patient in video visit and agree with the findings and plan of care as documented in the note.      Items personally reviewed/procedural attestation: labs, imaging and agree with the interpretation documented in the note, and I was present for the entire visit.    Kt Wilkinson MD        Review of the result(s) of each unique test - MRI 11/30/2023  Independent  interpretation of a test performed by another physician/other qualified health care professional (not separately reported) - MRI 11/30/2023    25 minutes spent by me on the date of the encounter doing chart review, review of test results, interpretation of tests, patient visit, and documentation          Video-Visit Details     Type of service:  Video Visit     Video Start and End Time:  Joined the call at 12/4/2023, 9:11:06?am.  Left the call at 12/4/2023, 9:21:34?am.  You were on the call for 10 minutes 27 seconds .  Originating Location (pt. Location): Home     Distant Location (provider location):  [facility]ASC[facility] [place]      Platform used for Video Visit: Co[employer]       Patient Care Team:  Kt Espinoza MD as PCP - General (Family Medicine)  Mary Mueller MD as MD (Gastroenterology)  Jaylene De Leon MD Thomson, Mary, MD as Assigned Gastroenterology Provider  SELF, REFERRED

## 2023-12-04 NOTE — PROGRESS NOTES
INTERVENTIONAL RADIOLOGY ESTABLISHED PATIENT FOLLOW UP      HPI: Dereck Boston is a 68 year old man with a history of HTN, HLD, DM, hemochromatosis, and cirrhosis found to have an inferior right hepatic lobe liver lesion that was biopsied 9/20/23 and demonstrated an HCC. He was initially seen by my colleague Dr. Bonilla and with a plan for MWA at the time of biopsy but due to unfavorable anatomy were unable to proceed. He subsequently underwent TACE of the inferior right hepatic lobe lesion on 11/1/23 with Dr. Bonilla. His care has been transferred to me with Dr. Bonilla leaving the practice. He is here for one month follow up.    Postprocedurally he had some pain and fatigue for a couple weeks. This has since resolved and he feels back to normal. No groin hematoma or pain after the procedure.    ROS:  Negative unless otherwise stated in HPI.    Physical Examination:   VITALS:   There were no vitals taken for this visit.    CONSTITUTIONAL: healthy, alert and no distress.   PSYCHIATRIC: mentation appears normal and affect normal.   NEURO: Normal movements and speech.   RESP: No audible cough or wheeze.     Labs:    BMP RESULTS:  Lab Results   Component Value Date     11/01/2023    POTASSIUM 4.2 11/01/2023    CHLORIDE 104 11/01/2023    CO2 22 11/01/2023    ANIONGAP 13 11/01/2023     (H) 11/01/2023     (H) 09/20/2023    BUN 13.7 11/01/2023    CR 0.77 11/01/2023    GFRESTIMATED >90 11/01/2023    MANI 9.7 11/01/2023        CBC RESULTS:  Lab Results   Component Value Date    WBC 6.3 11/01/2023    RBC 4.57 11/01/2023    HGB 14.6 11/01/2023    HCT 41.3 11/01/2023    MCV 90 11/01/2023    MCH 31.9 11/01/2023    MCHC 35.4 11/01/2023    RDW 12.6 11/01/2023     11/01/2023       INR/PTT:  Lab Results   Component Value Date    INR 1.15 11/01/2023       Diagnostic studies: Personally reviewed and interpreted.  MRI 11/30/23 - Stable size of 22 x 23 mm partially exophytic lesion in the inferior  right hepatic lobe is isointense to surrounding liver parenchyma on arterial phase, hypointense to parenchyma on portal venous phase with peripheral enhancement. Unfortunately there are not subtraction images available for review. On the arterial phase, it is difficult to determine whether the lesion is enhancing isointense to the liver or if there is just minimal liver enhancement due to timing.    Assessment   Dereck Boston is a 68 year old who is roughly one month s/p TACE of a partially exophytic biopsy proven HCC in the inferior right hepatic lobe, performed by Dr. Bonilla. He had some initial pain and fatigue the first few weeks postprocedurally but he now feels back to normal. Follow up MRI performed 11/30/23 is indeterminant due to equivocal internal enhancement of the lesion on arterial phase. At this point, based on the imaging, would recommend obtaining repeat imaging in 3 months rather then pursue another treatment at this time.     We briefly discussed that if there is evidence of residual/recurrent disease in 3 months we would likely recommend selective internal radiotherapy (Y90), but will assess at that time.     Plan   - Follow up in clinic in 3 months and with repeat Liver MR to be performed prior to visit.    Physician Attestation   I, Kt Wilkinson MD, saw this patient in video visit and agree with the findings and plan of care as documented in the note.      Items personally reviewed/procedural attestation: labs, imaging and agree with the interpretation documented in the note, and I was present for the entire visit.    Kt Wilkinson MD        Review of the result(s) of each unique test - MRI 11/30/2023  Independent interpretation of a test performed by another physician/other qualified health care professional (not separately reported) - MRI 11/30/2023    25 minutes spent by me on the date of the encounter doing chart review, review of test results, interpretation of tests,  patient visit, and documentation          Video-Visit Details     Type of service:  Video Visit     Video Start and End Time:  Joined the call at 12/4/2023, 9:11:06?am.  Left the call at 12/4/2023, 9:21:34?am.  You were on the call for 10 minutes 27 seconds .  Originating Location (pt. Location): Home     Distant Location (provider location):  [facility]ASC[facility] [place]      Platform used for Video Visit: Max       Patient Care Team:  Kt Espinoza MD as PCP - General (Family Medicine)  Mary Mueller MD as MD (Gastroenterology)  Jaylene De Leon MD Thomson, Mary, MD as Assigned Gastroenterology Provider  SELF, REFERRED

## 2023-12-04 NOTE — PROGRESS NOTES
"Virtual Visit Details    Type of service:  Video Visit   Video Start Time: {video visit start/end time for provider to select:190331}  Video End Time:{video visit start/end time for provider to select:590375}    Originating Location (pt. Location): {video visit patient location:165821::\"Home\"}  {PROVIDER LOCATION On-site should be selected for visits conducted from your clinic location or adjoining [facility] hospital, academic office, or other nearby [facility] building. Off-site should be selected for all other provider locations, including home:[address]68}  Distant Location (provider location):  {virtual location provider:690935}  Platform used for Video Visit: {Virtual Visit Platforms:945821::\"HiperScan\"}    "

## 2023-12-20 DIAGNOSIS — C22.0 HCC (HEPATOCELLULAR CARCINOMA) (H): Primary | ICD-10-CM

## 2023-12-21 ENCOUNTER — MYC MEDICAL ADVICE (OUTPATIENT)
Dept: INTERVENTIONAL RADIOLOGY/VASCULAR | Facility: CLINIC | Age: 68
End: 2023-12-21

## 2024-01-02 DIAGNOSIS — C22.0 HEPATOCELLULAR CARCINOMA (H): Primary | ICD-10-CM

## 2024-01-02 NOTE — PROGRESS NOTES
Faxed lab orders for appt with Dr. Mueller in February to [facility] in [place].    Lila DEL CID LPN  Hepatology Clinic

## 2024-01-19 ENCOUNTER — TELEPHONE (OUTPATIENT)
Dept: GASTROENTEROLOGY | Facility: CLINIC | Age: 69
End: 2024-01-19
Payer: MEDICARE

## 2024-01-19 NOTE — TELEPHONE ENCOUNTER
[facility]; detailed pts appt schedule and confirmed he is on the correct course per the provider's instructions- KB 1.19.24//

## 2024-02-05 ENCOUNTER — VIRTUAL VISIT (OUTPATIENT)
Dept: RADIOLOGY | Facility: CLINIC | Age: 69
End: 2024-02-05
Attending: STUDENT IN AN ORGANIZED HEALTH CARE EDUCATION/TRAINING PROGRAM
Payer: MEDICARE

## 2024-02-05 VITALS — HEIGHT: 67 IN | BODY MASS INDEX: 36.1 KG/M2 | WEIGHT: 230 LBS

## 2024-02-05 DIAGNOSIS — C22.0 HCC (HEPATOCELLULAR CARCINOMA) (H): Primary | ICD-10-CM

## 2024-02-05 PROCEDURE — 99215 OFFICE O/P EST HI 40 MIN: CPT | Mod: 95 | Performed by: STUDENT IN AN ORGANIZED HEALTH CARE EDUCATION/TRAINING PROGRAM

## 2024-02-05 ASSESSMENT — PAIN SCALES - GENERAL: PAINLEVEL: NO PAIN (0)

## 2024-02-05 NOTE — PROGRESS NOTES
"    INTERVENTIONAL RADIOLOGY FOLLOW UP    Name: Dereck Boston  Age: 68 year old       HPI: Dereck Boston is a 68 year old male with a history of hemochromatosis, cirrhosis, and biopsy-proven hepatocellular carcinoma who presents for follow up for HCC s/p uncomplicated TACE with attempted ablation on 2023 by Dr. Bonilla.  He is in his usual state of health with no acute complaints.  We discussed the results of the MRI performed on 2024.      SOCIAL HISTORY:   Social History     Tobacco Use    Smoking status: Former     Years: 30     Types: Cigarettes     Quit date:      Years since quittin.1    Smokeless tobacco: Never   Substance Use Topics    Alcohol use: Not Currently       PROBLEM LIST:   There are no problems to display for this patient.      ALLERGIES:   Atorvastatin and Penicillins    ROS:  Constitutional, HEENT, cardiovascular, pulmonary, gi and gu systems are negative, except as otherwise noted.    Physical Examination:   VITALS:   Ht 1.702 m (5' 7\")   Wt 104.3 kg (230 lb)   BMI 36.02 kg/m    SKIN: Visible skin clear.  GENERAL: Healthy, alert and no distress  EYES: Eyes grossly normal to inspection.    RESP:  No visible retractions or increased work of breathing.    NEURO: Cranial nerves grossly intact.  Mentation and speech appropriate for age.  PSYCH: Mentation appears normal, affect normal/bright, judgement and insight intact, normal speech and appearance well-groomed.    Labs:  2024   Cr 0.75 mg/dL  Albumin 4.5 g/dL  Total bilirubin 1.3 mg/dL  INR (2023) 1.1      Diagnostic studies:   MRI 2024 personally reviewed by me: Shows persistent enhancement of an unchanged 2.1 cm hepatic segment 5 lesion (LR TR viable).    Assessment Dereck Boston is a 68 year old male with a history of hemochromatosis, cirrhosis, and biopsy-proven hepatocellular carcinoma of segment 5 who presents after TACE on 2023.   Percutaneous ablation was aborted at this time due to close " proximity of bowel and inability to separate bowel from the target lesion.  Initial follow-up imaging was inconclusive given the relatively recent TACE procedure, and his AFP was never elevated pre procedure.  The MRI from 1/17/2024 shows persistent enhancement consistent with residual tumor.  We discussed treatment options including surgical ablation versus Y90 radiotherapy.  Mr. Boston chose to proceed with selective Y90 delivery. He is currently CHRIS 1, CP A5, BCLC A and is a good candidate for either of these options.  Plan Biopsy proven HCC in the setting of cirrhosis - Proceed with Y90 mapping/delivery.    I discussed the findings of the imaging and prior procedure with the patient. Alternatives were reviewed, including surgery and surgical ablation versus Y90 radioembolization.  The patient choose to proceed with Y90 treatment.    I explained the radioembolization procedure  - that it is a two step procedure on two different days that involves an angiogram and nuclear medicine study on each day, and that it requires insurance pre-approval. I explained that the first procedure involves mapping the arteries to the liver and embolizing any sidebranches that place the patient at risk of non-target radiation injury, then checking for shunting to the lungs. The second procedure involves delivering the radiation beads intra-arterially and imaging the liver afterwards to assess the pattern of radiation distribution. We discussed the results of the RASER trial and that there is evidence that Y90 in this setting is just as effective as surgical ablation.    I explained that both procedures are performed under conscious sedation. We discussed what will happen before, during and after the procedure; what to expect in the post procedure recovery period; and what the follow-up will be. We discussed post-radioembolization side effects which consists of varying degrees of pain, nausea, and lethargy. We discussed the risks  of the procedure which include, but are not limited to, vascular injury causing bleeding or occlusion of blood vessels, groin hematoma, infection, liver failure, non-target radiation injury to structures such as gallbladder/bowel/pancreas/lung.  Most patients go home the same day, but occasionally circumstances are such that a longer admission may be required. All of the patient's questions were answered and they agreed to proceed.     Independent interpretation of a test performed by another physician/other qualified health care professional (not separately reported) - MRI 1/17/2024 shows LR TR viable tumor in segment 5.     40 minutes spent by me on the date of the encounter doing chart review, interpretation of tests, patient visit, and documentation          Video-Visit Details     Type of service:  Video Visit     Joined the call at 2/5/2024, 8:01:04 am.  Left the call at 2/5/2024, 8:16:17 am.  You were on the call for 15 minutes 12 seconds .    Originating Location (pt. Location): Home     Distant Location (provider location):  [facility] V[facility]      Platform used for Video Visit: [employer]       Patient Care Team:  Kt Espinoza MD as PCP - General (Family Medicine)  Mary Mueller MD as MD (Gastroenterology)  Jaylene De Leon MD Thomson, Mary, MD as Assigned Gastroenterology Provider  SELF, REFERRED

## 2024-02-05 NOTE — LETTER
"    2024         RE: Dereck Boston  [address] [place] [address]        Dear Colleague,    Thank you for referring your patient, Dereck Boston, to the [facility]. Please see a copy of my visit note below.        INTERVENTIONAL RADIOLOGY FOLLOW UP    Name: Dereck Boston  Age: 68 year old       HPI: Dereck Boston is a 68 year old male with a history of hemochromatosis, cirrhosis, and biopsy-proven hepatocellular carcinoma who presents for follow up for HCC s/p uncomplicated TACE with attempted ablation on 2023 by Dr. Bonilla.  He is in his usual state of health with no acute complaints.  We discussed the results of the MRI performed on 2024.      SOCIAL HISTORY:   Social History     Tobacco Use    Smoking status: Former     Years: 30     Types: Cigarettes     Quit date:      Years since quittin.1    Smokeless tobacco: Never   Substance Use Topics    Alcohol use: Not Currently       PROBLEM LIST:   There are no problems to display for this patient.      ALLERGIES:   Atorvastatin and Penicillins    ROS:  Constitutional, HEENT, cardiovascular, pulmonary, gi and gu systems are negative, except as otherwise noted.    Physical Examination:   VITALS:   Ht 1.702 m (5' 7\")   Wt 104.3 kg (230 lb)   BMI 36.02 kg/m    SKIN: Visible skin clear.  GENERAL: Healthy, alert and no distress  EYES: Eyes grossly normal to inspection.    RESP:  No visible retractions or increased work of breathing.    NEURO: Cranial nerves grossly intact.  Mentation and speech appropriate for age.  PSYCH: Mentation appears normal, affect normal/bright, judgement and insight intact, normal speech and appearance well-groomed.    Labs:  2024   Cr 0.75 mg/dL  Albumin 4.5 g/dL  Total bilirubin 1.3 mg/dL  INR (2023) 1.1      Diagnostic studies:   MRI 2024 personally reviewed by me: Shows persistent enhancement of an unchanged 2.1 cm hepatic segment 5 lesion (LR TR viable).    Assessment " Dereck Boston is a 68 year old male with a history of hemochromatosis, cirrhosis, and biopsy-proven hepatocellular carcinoma of segment 5 who presents after TACE on 11/1/2023.   Percutaneous ablation was aborted at this time due to close proximity of bowel and inability to separate bowel from the target lesion.  Initial follow-up imaging was inconclusive given the relatively recent TACE procedure, and his AFP was never elevated pre procedure.  The MRI from 1/17/2024 shows persistent enhancement consistent with residual tumor.  We discussed treatment options including surgical ablation versus Y90 radiotherapy.  Mr. Boston chose to proceed with selective Y90 delivery. He is currently CHRIS 1, CP A5, BCLC A and is a good candidate for either of these options.  Plan Biopsy proven HCC in the setting of cirrhosis - Proceed with Y90 mapping/delivery.    I discussed the findings of the imaging and prior procedure with the patient. Alternatives were reviewed, including surgery and surgical ablation versus Y90 radioembolization.  The patient choose to proceed with Y90 treatment.    I explained the radioembolization procedure  - that it is a two step procedure on two different days that involves an angiogram and nuclear medicine study on each day, and that it requires insurance pre-approval. I explained that the first procedure involves mapping the arteries to the liver and embolizing any sidebranches that place the patient at risk of non-target radiation injury, then checking for shunting to the lungs. The second procedure involves delivering the radiation beads intra-arterially and imaging the liver afterwards to assess the pattern of radiation distribution. We discussed the results of the RASER trial and that there is evidence that Y90 in this setting is just as effective as surgical ablation.    I explained that both procedures are performed under conscious sedation. We discussed what will happen before, during and after  the procedure; what to expect in the post procedure recovery period; and what the follow-up will be. We discussed post-radioembolization side effects which consists of varying degrees of pain, nausea, and lethargy. We discussed the risks of the procedure which include, but are not limited to, vascular injury causing bleeding or occlusion of blood vessels, groin hematoma, infection, liver failure, non-target radiation injury to structures such as gallbladder/bowel/pancreas/lung.  Most patients go home the same day, but occasionally circumstances are such that a longer admission may be required. All of the patient's questions were answered and they agreed to proceed.     Independent interpretation of a test performed by another physician/other qualified health care professional (not separately reported) - MRI 1/17/2024 shows LR TR viable tumor in segment 5.     40 minutes spent by me on the date of the encounter doing chart review, interpretation of tests, patient visit, and documentation          Video-Visit Details     Type of service:  Video Visit     Joined the call at 2/5/2024, 8:01:04 am.  Left the call at 2/5/2024, 8:16:17 am.  You were on the call for 15 minutes 12 seconds .    Originating Location (pt. Location): Home     Distant Location (provider location):  [facility] VASCULAR [facility]      Platform used for Video Visit: Where's Up  Patient Care Team:  Kt Espinoza MD as PCP - General (Family Medicine)  Mary Mueller MD as MD (Gastroenterology)  Jaylene De Leon MD Thomson, Mary, MD as Assigned Gastroenterology Provider  SELF, REFERRED

## 2024-02-05 NOTE — NURSING NOTE
Is the patient currently in the state of [place]? YES    Visit mode:VIDEO    If the visit is dropped, the patient can be reconnected by: VIDEO VISIT: Send to e-mail at: [email or web address]    Will anyone else be joining the visit? NO  (If patient encounters technical issues they should call [phone]    How would you like to obtain your AVS? MyChart    Are changes needed to the allergy or medication list? No    Reason for visit: DAVID KENDRICK

## 2024-02-08 ENCOUNTER — HOSPITAL ENCOUNTER (OUTPATIENT)
Dept: GENERAL RADIOLOGY | Facility: CLINIC | Age: 69
Discharge: HOME | End: 2024-02-08
Attending: STUDENT IN AN ORGANIZED HEALTH CARE EDUCATION/TRAINING PROGRAM
Payer: MEDICARE

## 2024-02-08 DIAGNOSIS — C22.0 HCC (HEPATOCELLULAR CARCINOMA) (H): Primary | ICD-10-CM

## 2024-02-08 PROCEDURE — 999N000122 MR OUTSIDE READ

## 2024-02-08 PROCEDURE — 74183 MRI ABD W/O CNTR FLWD CNTR: CPT | Mod: 26 | Performed by: RADIOLOGY

## 2024-02-12 ENCOUNTER — HOSPITAL ENCOUNTER (OUTPATIENT)
Facility: CLINIC | Age: 69
End: 2024-02-12
Admitting: STUDENT IN AN ORGANIZED HEALTH CARE EDUCATION/TRAINING PROGRAM
Payer: MEDICARE

## 2024-02-16 ENCOUNTER — VIRTUAL VISIT (OUTPATIENT)
Dept: GASTROENTEROLOGY | Facility: CLINIC | Age: 69
End: 2024-02-16
Attending: STUDENT IN AN ORGANIZED HEALTH CARE EDUCATION/TRAINING PROGRAM
Payer: MEDICARE

## 2024-02-16 DIAGNOSIS — C22.0 HEPATOCELLULAR CARCINOMA (H): ICD-10-CM

## 2024-02-16 DIAGNOSIS — K74.60 CIRRHOSIS OF LIVER WITHOUT ASCITES, UNSPECIFIED HEPATIC CIRRHOSIS TYPE (H): Primary | ICD-10-CM

## 2024-02-16 PROCEDURE — 99214 OFFICE O/P EST MOD 30 MIN: CPT | Mod: GC | Performed by: STUDENT IN AN ORGANIZED HEALTH CARE EDUCATION/TRAINING PROGRAM

## 2024-02-16 NOTE — PROGRESS NOTES
Video visit time with Dr. Mueller  Start time: 10:12 AM  End time 10:26 AM  Patient at home   Provider on site  [facility] Return patient Visit    Date of Visit: Feb 16th 2024     Reason for referral: Cirrhosis and HCC    Subjective: Mr. 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      He was diagnosed with hemochromatosis years ago after his brother was. 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 Gets it every 2-3 months and his ferritin has been > 50. He has had imaging in the past that has showed hepatomegaly and steatosis, without signs of cirrhosis. He has never had a liver bx, has not followed with hepatology or GI.     He had a RUQ US 5/2023 that showed a mass in the right lobe of the liver with background cirrhosis. This was followed up by a liver MRI that showed LR 4 lesion in the right lobe of the liver. Also showed cirrhosis with evidence of regenerative nodules. No ascites. AFP normal. MRI 8/15 showing 2.4 cm LR4 lesion. Underwent liver bx of LR 4 lesion 9/2023 - showing well differentiated HCC in the background of cirrhosis. Ablation not performed due to adjacent bowel that did not separate with hydro dissection. He underwent TACE 11/1/2023.MRI from 1/17/2024 shows persistent enhancement consistent with residual tumor. Seen by IR Dr. Wilkinson recently and discussed either Y-90 vs surgical resection. He decided on Y-90 given less invasiveness.     At today's visit, he continues to do well.  Appetite has been great and he is ice fishing often to stay active.     He reports he stopped drinking since spring 2023 was drinking 5 beers/week. No history of treatment or DUIs.     ROS: 14 point ROS negative except for positives noted in HPI.    PMHx:  DM  HTN  HLD  No known history  of heart disease  No personal history of cancer    PSHx:  Ankle surgery for fracture  R parotid grand resection due to enlargement - thought it was cancer but it wasn't    FamHx:  No family history of liver disease, liver cancer  Brother with HC  Other brother had a liver transplant for ARLD  CVA    SocHx:  Social History     Socioeconomic History     Marital status:      Spouse name: Not on file     Number of children: Not on file     Years of education: Not on file     Highest education level: Not on file   Occupational History     Not on file   Tobacco Use     Smoking status: Former     Years: 30     Types: Cigarettes     Quit date:      Years since quittin.1     Smokeless tobacco: Never   Vaping Use     Vaping Use: Never used   Substance and Sexual Activity     Alcohol use: Not Currently     Drug use: Not Currently     Sexual activity: Not on file   Other Topics Concern     Not on file   Social History Narrative     Not on file     Social Determinants of Health     Financial Resource Strain: Not on file   Food Insecurity: Not on file   Transportation Needs: Not on file   Physical Activity: Not on file   Stress: Not on file   Social Connections: Not on file   Interpersonal Safety: Not on file   Housing Stability: Not on file   Last drink spring 2023, would drink 5 beers/week, denies history of DUI  YOSSI - Felicity helps with care  Retired - previously worked as a  for [place] [employer] [place]    Medications:  Current Outpatient Medications   Medication     ACCU-CHEK GUIDE test strip     amLODIPine (NORVASC) 10 MG tablet     atorvastatin (LIPITOR) 10 MG tablet     blood glucose monitoring (ACCU-CHEK FASTCLIX) lancets     lisinopril-hydrochlorothiazide (ZESTORETIC) 20-12.5 MG tablet     metFORMIN (GLUCOPHAGE) 1000 MG tablet     study - aspirin, IDS# [id number], 81 mg tablet     No current facility-administered medications for this visit.     No OTCs,  herbals    Allergies:  Allergies   Allergen Reactions     Atorvastatin Other (See Comments)     Other reaction(s): *Unknown  Abnormal Liver Functions, ( is taking this at a small dose)  Abnormal Liver Functions, ( is taking this at a small dose)       Penicillins Other (See Comments) and Unknown     Other reaction(s): *Unknown - Childhood Rxn         Objective:  There were no vitals taken for this visit.  Constitutional: pleasant man in NAD  Eyes: non icteric  Respiratory: Normal respiratory excursion   MSK: normal range of motion of visualized extremities  Abd: Non distended  Skin: No jaundice  Psychiatric: normal mood and orientation    Labs: Reviewed in EHR, no recent AFP    TBR 1.0  Platelets 158,000  AFP: 7.8 not elevated.     Imaging: Reviewed in EHR    Independently reviewed labs and imaging.       Assessment/Plan: Mr. 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      He had unsuccessful TACE and currently is planned for Y90 radioembolization with Dr. Wilkinson next month.  Discussed potential surgical resection given the tumors locations - will discuss with surgical oncology and IR. His platelets are greater than 150k, but recommend an EGD to screen for varies    Discussed that if his HCC remained after the next step treatment or he were found to have additional lesions in the liver, he would likely qualify for exception points for liver transplant.  He was hesitant to get a liver transplant in the past but understands that if the above situation rises, transplant will be a curative treatment.  Discussed that his transplant work-up would include cardiac testing it is possible that we could find coronary disease that would delay his work-up. He reports having a colonoscopy 2019 that was normal and was told to repeat in 10  years.    We also discussed that he had resolved hepatitis B and C infection.     RTC 6 months.    Patient was seen and discussed with hepatology staff Dr. Mary Mueller.     Brisa Mayen MD PhD  GI Fellow   [phone]     Physician Attestation   I, Mary Muelelr MD, saw this patient and agree with the findings and plan of care as documented in the note.      Items personally reviewed/procedural attestation: labs and imaging and agree with the interpretation documented in the note.    Mary Mueller MD

## 2024-02-16 NOTE — PATIENT INSTRUCTIONS
It was very nice meeting you today.     Dr. Mueller will discuss with Dr. Wilkinson about surgical resection versus Y-90 and update you.     Someone will call you about an upper endoscopy here at [place].     Dr. Mueller will see you in 6 months.

## 2024-02-16 NOTE — PROGRESS NOTES
"Virtual Visit Details    Type of service:  Video Visit   Video Start Time: {video visit start/end time for provider to select:372952}  Video End Time:{video visit start/end time for provider to select:173109}    Originating Location (pt. Location): {video visit patient location:908712::\"Home\"}  {PROVIDER LOCATION On-site should be selected for visits conducted from your clinic location or adjoining [facility] hospital, academic office, or other nearby [facility] building. Off-site should be selected for all other provider locations, including home:006421}  Distant Location (provider location):  {virtual location provider:911458}  Platform used for Video Visit: {Virtual Visit Platforms:166208::\"[employer]\"}  "

## 2024-02-16 NOTE — LETTER
2/16/2024         RE: Dereck Boston  [id number] [place] [address]        Dear Colleague,    Thank you for referring your patient, Dereck Boston, to the [facility] [place]. Please see a copy of my visit note below.    Video visit time with Dr. Mueller  Start time: 10:12 AM  End time 10:26 AM  Patient at home   Provider on site  [facility] Return patient Visit    Date of Visit: Feb 16th 2024     Reason for referral: Cirrhosis and HCC    Subjective: Mr. Bosotn is a 68 year old man with a history of HTN, HLD, DM, hemochromatosis on phlebotomy, resolved hepatitis B and C infection, compensated cirrhosis with a unifocal HCC s/p TACE 11/1/23 with ongoing tumor here for follow up.      He was diagnosed with hemochromatosis years ago after his brother was. 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 Gets it every 2-3 months and his ferritin has been > 50. He has had imaging in the past that has showed hepatomegaly and steatosis, without signs of cirrhosis. He has never had a liver bx, has not followed with hepatology or GI.     He had a RUQ US 5/2023 that showed a mass in the right lobe of the liver with background cirrhosis. This was followed up by a liver MRI that showed LR 4 lesion in the right lobe of the liver. Also showed cirrhosis with evidence of regenerative nodules. No ascites. AFP normal. MRI 8/15 showing 2.4 cm LR4 lesion. Underwent liver bx of LR 4 lesion 9/2023 - showing well differentiated HCC in the background of cirrhosis. Ablation not performed due to adjacent bowel that did not separate with hydro dissection. He underwent TACE 11/1/2023.MRI from 1/17/2024 shows persistent enhancement consistent with residual tumor. Seen by IR Dr. Wilkinson recently and discussed either Y-90 vs surgical resection. He decided on Y-90 given less invasiveness.     At today's visit, he continues to do well.  Appetite has been great and he is  ice fishing often to stay active.     He reports he stopped drinking since spring 2023 was drinking 5 beers/week. No history of treatment or DUIs.     ROS: 14 point ROS negative except for positives noted in HPI.    PMHx:  DM  HTN  HLD  No known history of heart disease  No personal history of cancer    PSHx:  Ankle surgery for fracture  R parotid grand resection due to enlargement - thought it was cancer but it wasn't    FamHx:  No family history of liver disease, liver cancer  Brother with HC  Other brother had a liver transplant for ARLD  CVA    SocHx:  Social History     Socioeconomic History    Marital status:      Spouse name: Not on file    Number of children: Not on file    Years of education: Not on file    Highest education level: Not on file   Occupational History    Not on file   Tobacco Use    Smoking status: Former     Years: 30     Types: Cigarettes     Quit date:      Years since quittin.1    Smokeless tobacco: Never   Vaping Use    Vaping Use: Never used   Substance and Sexual Activity    Alcohol use: Not Currently    Drug use: Not Currently    Sexual activity: Not on file   Other Topics Concern    Not on file   Social History Narrative    Not on file     Social Determinants of Health     Financial Resource Strain: Not on file   Food Insecurity: Not on file   Transportation Needs: Not on file   Physical Activity: Not on file   Stress: Not on file   Social Connections: Not on file   Interpersonal Safety: Not on file   Housing Stability: Not on file   Last drink spring 2023, would drink 5 beers/week, denies history of DUI  YOSSI - Felicity helps with care  Retired - previously worked as a  for a [employer] company [place]    Medications:  Current Outpatient Medications   Medication    ACCU-CHEK GUIDE test strip    amLODIPine (NORVASC) 10 MG tablet    atorvastatin (LIPITOR) 10 MG tablet    blood glucose monitoring (ACCU-CHEK FASTCLIX) lancets     lisinopril-hydrochlorothiazide (ZESTORETIC) 20-12.5 MG tablet    metFORMIN (GLUCOPHAGE) 1000 MG tablet    study - aspirin, IDS# [id number], 81 mg tablet     No current facility-administered medications for this visit.     No OTCs, herbals    Allergies:  Allergies   Allergen Reactions    Atorvastatin Other (See Comments)     Other reaction(s): *Unknown  Abnormal Liver Functions, ( is taking this at a small dose)  Abnormal Liver Functions, ( is taking this at a small dose)      Penicillins Other (See Comments) and Unknown     Other reaction(s): *Unknown - Childhood Rxn         Objective:  There were no vitals taken for this visit.  Constitutional: pleasant man in NAD  Eyes: non icteric  Respiratory: Normal respiratory excursion   MSK: normal range of motion of visualized extremities  Abd: Non distended  Skin: No jaundice  Psychiatric: normal mood and orientation    Labs: Reviewed in EHR, no recent AFP    TBR 1.0  Platelets 158,000  AFP: 7.8 not elevated.     Imaging: Reviewed in EHR    Independently reviewed labs and imaging.       Assessment/Plan: Mr. 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      He had unsuccessful TACE and currently is planned for Y90 radioembolization with Dr. Wilkinson next month.  Discussed potential surgical resection given the tumors locations - will discuss with surgical oncology and IR. His platelets are greater than 150k, but recommend an EGD to screen for varies    Discussed that if his HCC remained after the next step treatment or he were found to have additional lesions in the liver, he would likely qualify for exception points for liver transplant.  He was hesitant to get a liver transplant in the past but understands that if the above situation rises, transplant will be a curative treatment.  Discussed that his  transplant work-up would include cardiac testing it is possible that we could find coronary disease that would delay his work-up. He reports having a colonoscopy 2019 that was normal and was told to repeat in 10 years.    We also discussed that he had resolved hepatitis B and C infection.     RTC 6 months.    Patient was seen and discussed with hepatology staff Dr. Mary Mueller.     Brisa Mayen MD PhD  GI Fellow   [phone]     Physician Attestation   I, Mary Mueller MD, saw this patient and agree with the findings and plan of care as documented in the note.      Items personally reviewed/procedural attestation: labs and imaging and agree with the interpretation documented in the note.    Mary Mueller MD

## 2024-02-16 NOTE — NURSING NOTE
Is the patient currently in the state of [place]? YES    Visit mode:VIDEO    If the visit is dropped, the patient can be reconnected by: VIDEO VISIT: Text to cell phone:   Telephone Information:   Mobile [phone]       Will anyone else be joining the visit? NO  (If patient encounters technical issues they should call [phone] :[phone])    How would you like to obtain your AVS? MyChart    Are changes needed to the allergy or medication list? No    Reason for visit: RECHECK    Anil KENDRICK

## 2024-02-18 DIAGNOSIS — C22.0 HEPATOCELLULAR CARCINOMA (H): Primary | ICD-10-CM

## 2024-02-20 ENCOUNTER — PATIENT OUTREACH (OUTPATIENT)
Dept: SURGERY | Facility: CLINIC | Age: 69
End: 2024-02-20
Payer: MEDICARE

## 2024-02-20 NOTE — PROGRESS NOTES
Virtual Visit Details    Type of service:  Video Visit   Video Start Time:  750  Video End Time: 820    Originating Location (pt. Location): Home    Distant Location (provider location):  On-site  Platform used for Video Visit: Max            Department of Surgery  Division of Surgical Oncology    New Patient Consultation  Feb 26, 2024    Dereck Boston is a 68 year old male who presents with HCC.  He was referred by Dr Mueller.    History of Present Illness     Mr. Boston has an extensive history and is a patient of Dr. Mueller in the Hepatology clinic. He has hemochromatosis on phlebotomy, prior and resolved Hep B and Hep C, with compensated cirrhosis and prior HCC (dx'd after LR4 lesion from August 20203 MRI) s/p TACE 11/1/23.     MRI from 1/17/24 shows persistent enhancement of the treated area. He did recent see Dr. Wilkinson from IR to discuss Y90 and is meeting today to discuss surgical resection.    He has stopped drinking since Spring 2023; prior to that, 5 drinks / week.    Overall he is doing well. He did have 3 days of fatigue after his TACE previously. This lesion was TACE'd due to proximity with the colon making percutaneous ablation a challenge/risk and Y90 was not considered for similar reasons.      Past Medical History:   Diagnosis Date    Diabetes (H)     Hypercholesteremia     Hypertension     Sleep apnea        Past Surgical History:   Procedure Laterality Date    IR CHEMO EMBOLIZATION  11/01/2023    IR LIVER BIOPSY PERCUTANEOUS  09/20/2023    ORTHOPEDIC SURGERY      Broken ankle    SALIVARY GLAND SURGERY         Current Outpatient Medications   Medication Sig Dispense Refill    ACCU-CHEK GUIDE test strip AS DIRECTED IN VITRO ONCE A DAY E11.9 NON INSULIN      amLODIPine (NORVASC) 10 MG tablet Take 1 tablet by mouth daily      atorvastatin (LIPITOR) 10 MG tablet Take 5 mg by mouth      blood glucose monitoring (ACCU-CHEK FASTCLIX) lancets AS DIRECTED E11.9 NON INSULIN ONCE A DAY       lisinopril-hydrochlorothiazide (ZESTORETIC) 20-12.5 MG tablet Take 1 tablet by mouth daily      metFORMIN (GLUCOPHAGE) 1000 MG tablet Take 1,000 mg by mouth 2 times daily (with meals)      study - aspirin, IDS# [id number], 81 mg tablet Take 1 tablet by mouth daily          Allergies   Allergen Reactions    Atorvastatin Other (See Comments)     Other reaction(s): *Unknown  Abnormal Liver Functions, ( is taking this at a small dose)  Abnormal Liver Functions, ( is taking this at a small dose)      Penicillins Other (See Comments) and Unknown     Other reaction(s): *Unknown - Childhood Rxn          Social History:   reports that he quit smoking about 31 years ago. His smoking use included cigarettes. He has never used smokeless tobacco. He reports that he does not currently use alcohol. He reports that he does not currently use drugs.    Family History:  History reviewed. No pertinent family history.    Labs:  CBC and CMP reviewed from 1/16/24 (outside)    Imaging:  MRI 1/16/24 reviewed    Formal outside images interpretation here:     IMPRESSION:   1. Cirrhosis and evidence of portal hypertension with splenomegaly.  2. Treatment changes involving an exophytic inferior right hepatic  lobe lesion with thick area of peripheral enhancement. LR TR  equivocal.  3. Additional small LI-RADS 3 indeterminate lesions in the liver.  4. Retroperitoneal enlarged lymph node is stable from comparison exam,  indeterminate.  5. Of note, arterial phases are earlier than is optimal which limits  sensitivity and limits evaluation of the treated lesion.      Assessment & Plan     Dereck Boston is a 68 year old male with hemochromatosis and compensated cirrhosis with HCC s/p TACE with persistent enhancement concerning for residual tumor.    The natural history of HCC was discussed with the patient.    Hepatocellular carcinoma (HCC) has many different possible causes including hepatitis, liver injury from alcohol or fatty liver disease, or other  "reasons that are not as well studied. There are many possible treatments so figuring out the ideal approach for each patient frequently requires multidisciplinary collaboration.    Surgical management of HCC can include resection - usually only offered for patients with good liver function presenting with large but resectable tumors - and laparoscopic microwave ablation. Typically, these approaches are incorporated into a treatment plan that addresses the \"field defect\" for a patient with HCC, in which the entire liver is injured and at risk of new sites of HCC even with successful treatment of known sites of disease. As a result, liver transplantation - for qualified patients - is regularly considered, and local therapies/interventions are employed as a \"bridge\" to transplant, buying time until a patient can get a liver while waiting on the transplant list.    We also briefly discussed non-surgical treatment options done by other providers including percutaneous microwave ablation, Y90 (radiation), and TACE (chemoembolization).    In this case, the etiology of this patient's liver disease is multifactorial. On review of their imaging, I see a single lesion best viewed on coronal images at the inferior aspect of the right liver. This measures around 3.1cm x  1.7cm. Surgical resection of this lesion is possible although may be more invasive than necessary. I think that ablation is an appropriate choice in this setting with much less morbidity - that may not be possible percutaneously due to the location of this tumor close to the colon.    He is already scheduled for Y90, which is also a reasonable option.  In the end, he was motivated to have a single procedure that would have a very high success rate. He was considering resection, as he didn't want to have to consider post-procedure MRIs to 'see if it worked', but I made it clear that he will need post-treatment MRIs regardless as part of his cancer surveillance. " In the end, I described risks and benefits of both ablation and resection and he opted for resection.    We discussed the risks, benefits, and alternatives to the proposed surgery: laparoscopic microwave ablation of a single liver lesion. Risks include infection, bleeding, and damage to nearby structures, as well as the potential for heart or lung complications among other possible medical complications that could contribute to a low but non-zero risk of death with this procedure. Risks specific to this procedure include incomplete ablation and post-ablation syndrome, which could require additional treatments or procedures and would likely prolong the anticipated hospital stay and post-discharge recovery. Long-term risks could include pain, and hernia, among others, and we discussed the potential for disease recurrence - he has had a discussion with Dr. Mueller about transplant but, at the moment, wants to proceed with non-transplant options. We also had a lengthy discussion about expected in-hospital and post-discharge recovery, which may be affected by anticipated and unanticipated complications. After this discussion, the patient agreed to proceed with the surgery as planned.     All questions were answered to their apparent satisfaction, and contact information was provided should additional questions or concerns arise.    Plan Summary:  -Book for laparoscopic MWA x1  -I have sent a note to Dr. Wilkinson and Dr. Mueller informing them of our plan  -Patient is aware he will need a post-ablation MRI; post-op follow-up can be virtual if he has no issues  -Patient has PCP pre-op scheduled for later this week; will need updated labs - we will order      Sridhar Segundo MD MPHS      Today's visit was centered around a new cancer diagnosis in the setting of additional medical comorbidities. The risk of additional morbidity or mortality with or without further treatment is high. Total time spent was 60 minutes, including  but not limited to patient-facing time, chart review, review of tests/studies as noted above, and care coordination.

## 2024-02-20 NOTE — PROGRESS NOTES
New Patient Oncology Nurse Navigator Note     Referring provider: Dr. Mueller     Referring Clinic/Organization: [facility]     Referred to: Surgical Oncology -  Hepatobiliary / GI Cancers     Requested provider (if applicable): First available provider    Referral Received: 02/20/24       Evaluation for : Hepatocellular Cancer      Clinical History (per Nurse review of records provided):      See book marked documents:     Referring MD office note  Pathology report  Imaging reports   Procedure report       Allergies   Allergen Reactions    Atorvastatin Other (See Comments)     Other reaction(s): *Unknown  Abnormal Liver Functions, ( is taking this at a small dose)  Abnormal Liver Functions, ( is taking this at a small dose)      Penicillins Other (See Comments) and Unknown     Other reaction(s): *Unknown - Childhood Rxn          Past Medical History:   Diagnosis Date    Diabetes (H)     Hypertension     Sleep apnea        Past Surgical History:   Procedure Laterality Date    IR CHEMO EMBOLIZATION  11/1/2023    IR LIVER BIOPSY PERCUTANEOUS  9/20/2023    ORTHOPEDIC SURGERY         Current Outpatient Medications   Medication Sig Dispense Refill    ACCU-CHEK GUIDE test strip AS DIRECTED IN VITRO ONCE A DAY E11.9 NON INSULIN      amLODIPine (NORVASC) 10 MG tablet Take 1 tablet by mouth daily      atorvastatin (LIPITOR) 10 MG tablet Take 5 mg by mouth      blood glucose monitoring (ACCU-CHEK FASTCLIX) lancets AS DIRECTED E11.9 NON INSULIN ONCE A DAY      lisinopril-hydrochlorothiazide (ZESTORETIC) 20-12.5 MG tablet Take 1 tablet by mouth daily      metFORMIN (GLUCOPHAGE) 1000 MG tablet Take 1,000 mg by mouth 2 times daily (with meals)      study - aspirin, IDS# [id number], 81 mg tablet Take 1 tablet by mouth daily          There is no problem list on file for this patient.        Clinical Assessment / Barriers to Care (Per Nurse):    None at this time.     Records Location:     [facility] Everywhere     Records Needed:      ABDOMINAL IMAGING (CT SCANS, MRI, US, PET SCANS)  DATING BACK TO 2018      Additional testing needed prior to consult:     NONE AT THIS TIME    Referral updates and Plan:       Consult with Surgical Oncology     02/20/2024 1:14 PM - called and spoke with patient regarding referral and discuss proceeding with scheduling consult. He was aware of plan for visit and would like to proceed with scheduling. Patient was transferred to scheduling to finalize appointment details.     Saranya Owens, RN, BSN   Surgical Oncology New Patient Nurse Navigator  [facility]  [phone]

## 2024-02-20 NOTE — TELEPHONE ENCOUNTER
RECORDS STATUS - ALL OTHER DIAGNOSIS      RECORDS RECEIVED FROM: [facility], KANNAN [place], [facility]   DATE RECEIVED: 2/23   NOTES STATUS DETAILS   OFFICE NOTE from referring provider Epic Dr. Mary Mueller: 2/16/24   OFFICE NOTE from medical oncologist [facility] Dr. Andrey Rolon: 6/22/22   DISCHARGE SUMMARY from hospital [facility] 11/1/23, 9/20/23   OPERATIVE REPORT [facility]/ - [facility]  9/20/23: Liver Bx    [facility]  8/24/23: EGD  4/1/21: Parotidectomy   MEDICATION LIST [facility]   LABS     PATHOLOGY REPORTS [facility] 9/20/23: Surg Path   ANYTHING RELATED TO DIAGNOSIS Epic/ 1/16/24   IMAGING (NEED IMAGES & REPORT)     MRI PACS [facility]  1/16/24, 11/30/23, 8/7/23, 6/5/23: [facility]   ULTRASOUND PACS [facility]  5/25/23   CT SCANS PACS [facility]  10/23/23   ULTRASOUND PACS [facility]  12/20/22, 7/6/22, 12/23/21, 1/5/21, 1/6/19, 1/4/17, 9/29/15   PET PACS [facility]  2/23/21

## 2024-02-22 ENCOUNTER — TELEPHONE (OUTPATIENT)
Dept: GASTROENTEROLOGY | Facility: CLINIC | Age: 69
End: 2024-02-22
Payer: MEDICARE

## 2024-02-22 NOTE — TELEPHONE ENCOUNTER
"Endoscopy Scheduling Screen    Have you had a positive Covid test in the last 14 days?  No      Are you active on MyChart?   Yes      What insurance is in the chart?  Other:  medicare & commercial      Ordering/Referring Provider: monico moreno   (If ordering provider performs procedure, schedule with ordering provider unless otherwise instructed. )    BMI: Estimated body mass index is 36.02 kg/m  as calculated from the following:    Height as of 2/5/24: 1.702 m (5' 7\").    Weight as of 2/5/24: 104.3 kg (230 lb).     Sedation Ordered  moderate sedation.   If patient BMI > 50 do not schedule in ASC.    If patient BMI > 45 do not schedule at [facility].    Are you taking methadone or Suboxone?  No    Have you had difficulties, pain, or discomfort during past endoscopy procedures?  No      Are you taking any prescription medications for pain 3 or more times per week?   NO - No RN review required.      Do you have a history of malignant hyperthermia or adverse reaction to anesthesia?  No    (Females) Are you currently pregnant?          Have you been diagnosed or told you have pulmonary hypertension?   No      Do you have an LVAD?  No      Have you been told you have moderate to severe sleep apnea?  Yes (RN Review required for scheduling unless scheduling in Hospital.) CPAP      Have you been told you have COPD, asthma, or any other lung disease?  No      Do you have any heart conditions?  No       Have you ever had an organ transplant?   No      Have you ever had or are you awaiting a heart or lung transplant?   No      In the last 6 months have you had a stroke or transient ischemic attack (TIA aka \"mini stroke\")?   No      Have you been diagnosed with or been told you have cirrhosis of the liver?   Yes (RN Review required for scheduling unless scheduling in Hospital.)      Are you currently on dialysis?   No      Do you need assistance transferring?   No    BMI: Estimated body mass index is 36.02 kg/m  as calculated from " "the following:    Height as of 2/5/24: 1.702 m (5' 7\").    Weight as of 2/5/24: 104.3 kg (230 lb).     Is patients BMI > 40 and scheduling location [facility]?  No      Do you take an injectable medication for weight loss or diabetes (excluding insulin)?  No      Do you take the medication Naltrexone?  No      Do you take blood thinners?  No       Prep   Are you currently on dialysis or do you have chronic kidney disease?  No      Do you have a diagnosis of diabetes?  Yes (Golytely Prep)      Do you have a diagnosis of cystic fibrosis (CF)?  No      On a regular basis do you go 3 -5 days between bowel movements?  N/A      BMI > 40?      Preferred Pharmacy:    [id number] - [place], [place] - [address] [facility]  [place] [address]  Phone: [phone] Fax: [phone]      Final Scheduling Details   Colonoscopy prep sent?  N/A    Procedure scheduled  Upper endoscopy (EGD)    Surgeon:  MIRNA     Date of procedure:  5/23     Pre-OP / PAC:   No - Not required for this site.    Location  [facility] - Per exclusion criteria.    Sedation   Moderate Sedation - Per order.      Patient Reminders:   You will receive a call from a Nurse to review instructions and health history.  This assessment must be completed prior to your procedure.  Failure to complete the Nurse assessment may result in the procedure being cancelled.      On the day of your procedure, please designate an adult(s) who can drive you home stay with you for the next 24 hours. The medicines used in the exam will make you sleepy. You will not be able to drive.      You cannot take public transportation, ride share services, or non-medical taxi service without a responsible caregiver.  Medical transport services are allowed with the requirement that a responsible caregiver will receive you at your destination.  We require that drivers and caregivers are confirmed prior to your procedure.    "

## 2024-02-26 ENCOUNTER — VIRTUAL VISIT (OUTPATIENT)
Dept: SURGERY | Facility: CLINIC | Age: 69
End: 2024-02-26
Attending: STUDENT IN AN ORGANIZED HEALTH CARE EDUCATION/TRAINING PROGRAM
Payer: MEDICARE

## 2024-02-26 ENCOUNTER — TELEPHONE (OUTPATIENT)
Dept: SURGERY | Facility: CLINIC | Age: 69
End: 2024-02-26

## 2024-02-26 ENCOUNTER — PRE VISIT (OUTPATIENT)
Dept: SURGERY | Facility: CLINIC | Age: 69
End: 2024-02-26
Payer: MEDICARE

## 2024-02-26 VITALS — BODY MASS INDEX: 36.1 KG/M2 | HEIGHT: 67 IN | WEIGHT: 230 LBS

## 2024-02-26 DIAGNOSIS — C22.0 HEPATOCELLULAR CARCINOMA (H): Primary | ICD-10-CM

## 2024-02-26 PROCEDURE — 99205 OFFICE O/P NEW HI 60 MIN: CPT | Mod: 95 | Performed by: SURGERY

## 2024-02-26 ASSESSMENT — PAIN SCALES - GENERAL: PAINLEVEL: NO PAIN (0)

## 2024-02-26 NOTE — NURSING NOTE
Is the patient currently in the state of [place]? YES    Visit mode:VIDEO    If the visit is dropped, the patient can be reconnected by: VIDEO VISIT: Text to cell phone:   Telephone Information:   Mobile [phone]       Will anyone else be joining the visit? NO  (If patient encounters technical issues they should call [phone]    How would you like to obtain your AVS? MyChart    Are changes needed to the allergy or medication list? Pt stated no changes to allergies and Pt stated no med changes    Reason for visit: Consult    Kristin Braxton LPN

## 2024-02-26 NOTE — LETTER
2/26/2024         RE: Dereck Boston  [id number] [place] [address]        Dear Colleague,    Thank you for referring your patient, Dereck Boston, to the [facility]. Please see a copy of my visit note below.    Virtual Visit Details    Type of service:  Video Visit   Video Start Time:  750  Video End Time: 820    Originating Location (pt. Location): Home    Distant Location (provider location):  On-site  Platform used for Video Visit: [facility]            Department of Surgery  Division of Surgical Oncology    New Patient Consultation  Feb 26, 2024    Dereck Boston is a 68 year old male who presents with HCC.  He was referred by Dr Mueller.    History of Present Illness    Mr. Boston has an extensive history and is a patient of Dr. Mueller in the Hepatology clinic. He has hemochromatosis on phlebotomy, prior and resolved Hep B and Hep C, with compensated cirrhosis and prior HCC (dx'd after LR4 lesion from August 20203 MRI) s/p TACE 11/1/23.     MRI from 1/17/24 shows persistent enhancement of the treated area. He did recent see Dr. Wilkinson from IR to discuss Y90 and is meeting today to discuss surgical resection.    He has stopped drinking since Spring 2023; prior to that, 5 drinks / week.    Overall he is doing well. He did have 3 days of fatigue after his TACE previously. This lesion was TACE'd due to proximity with the colon making percutaneous ablation a challenge/risk and Y90 was not considered for similar reasons.      Past Medical History:   Diagnosis Date    Diabetes (H)     Hypercholesteremia     Hypertension     Sleep apnea        Past Surgical History:   Procedure Laterality Date    IR CHEMO EMBOLIZATION  11/01/2023    IR LIVER BIOPSY PERCUTANEOUS  09/20/2023    ORTHOPEDIC SURGERY      Broken ankle    SALIVARY GLAND SURGERY         Current Outpatient Medications   Medication Sig Dispense Refill    ACCU-CHEK GUIDE test strip AS DIRECTED IN VITRO ONCE A DAY E11.9 NON INSULIN       amLODIPine (NORVASC) 10 MG tablet Take 1 tablet by mouth daily      atorvastatin (LIPITOR) 10 MG tablet Take 5 mg by mouth      blood glucose monitoring (ACCU-CHEK FASTCLIX) lancets AS DIRECTED E11.9 NON INSULIN ONCE A DAY      lisinopril-hydrochlorothiazide (ZESTORETIC) 20-12.5 MG tablet Take 1 tablet by mouth daily      metFORMIN (GLUCOPHAGE) 1000 MG tablet Take 1,000 mg by mouth 2 times daily (with meals)      study - aspirin, IDS# [id number], 81 mg tablet Take 1 tablet by mouth daily          Allergies   Allergen Reactions    Atorvastatin Other (See Comments)     Other reaction(s): *Unknown  Abnormal Liver Functions, ( is taking this at a small dose)  Abnormal Liver Functions, ( is taking this at a small dose)      Penicillins Other (See Comments) and Unknown     Other reaction(s): *Unknown - Childhood Rxn          Social History:   reports that he quit smoking about 31 years ago. His smoking use included cigarettes. He has never used smokeless tobacco. He reports that he does not currently use alcohol. He reports that he does not currently use drugs.    Family History:  History reviewed. No pertinent family history.    Labs:  CBC and CMP reviewed from 1/16/24 (outside)    Imaging:  MRI 1/16/24 reviewed    Formal outside images interpretation here:     IMPRESSION:   1. Cirrhosis and evidence of portal hypertension with splenomegaly.  2. Treatment changes involving an exophytic inferior right hepatic  lobe lesion with thick area of peripheral enhancement. LR TR  equivocal.  3. Additional small LI-RADS 3 indeterminate lesions in the liver.  4. Retroperitoneal enlarged lymph node is stable from comparison exam,  indeterminate.  5. Of note, arterial phases are earlier than is optimal which limits  sensitivity and limits evaluation of the treated lesion.      Assessment & Plan    Dereck Boston is a 68 year old male with hemochromatosis and compensated cirrhosis with HCC s/p TACE with persistent enhancement concerning  "for residual tumor.    The natural history of HCC was discussed with the patient.    Hepatocellular carcinoma (HCC) has many different possible causes including hepatitis, liver injury from alcohol or fatty liver disease, or other reasons that are not as well studied. There are many possible treatments so figuring out the ideal approach for each patient frequently requires multidisciplinary collaboration.    Surgical management of HCC can include resection - usually only offered for patients with good liver function presenting with large but resectable tumors - and laparoscopic microwave ablation. Typically, these approaches are incorporated into a treatment plan that addresses the \"field defect\" for a patient with HCC, in which the entire liver is injured and at risk of new sites of HCC even with successful treatment of known sites of disease. As a result, liver transplantation - for qualified patients - is regularly considered, and local therapies/interventions are employed as a \"bridge\" to transplant, buying time until a patient can get a liver while waiting on the transplant list.    We also briefly discussed non-surgical treatment options done by other providers including percutaneous microwave ablation, Y90 (radiation), and TACE (chemoembolization).    In this case, the etiology of this patient's liver disease is multifactorial. On review of their imaging, I see a single lesion best viewed on coronal images at the inferior aspect of the right liver. This measures around 3.1cm x  1.7cm. Surgical resection of this lesion is possible although may be more invasive than necessary. I think that ablation is an appropriate choice in this setting with much less morbidity - that may not be possible percutaneously due to the location of this tumor close to the colon.    He is already scheduled for Y90, which is also a reasonable option.  In the end, he was motivated to have a single procedure that would have a very high " success rate. He was considering resection, as he didn't want to have to consider post-procedure MRIs to 'see if it worked', but I made it clear that he will need post-treatment MRIs regardless as part of his cancer surveillance. In the end, I described risks and benefits of both ablation and resection and he opted for resection.    We discussed the risks, benefits, and alternatives to the proposed surgery: laparoscopic microwave ablation of a single liver lesion. Risks include infection, bleeding, and damage to nearby structures, as well as the potential for heart or lung complications among other possible medical complications that could contribute to a low but non-zero risk of death with this procedure. Risks specific to this procedure include incomplete ablation and post-ablation syndrome, which could require additional treatments or procedures and would likely prolong the anticipated hospital stay and post-discharge recovery. Long-term risks could include pain, and hernia, among others, and we discussed the potential for disease recurrence - he has had a discussion with Dr. Mueller about transplant but, at the moment, wants to proceed with non-transplant options. We also had a lengthy discussion about expected in-hospital and post-discharge recovery, which may be affected by anticipated and unanticipated complications. After this discussion, the patient agreed to proceed with the surgery as planned.     All questions were answered to their apparent satisfaction, and contact information was provided should additional questions or concerns arise.    Plan Summary:  -Book for laparoscopic MWA x1  -I have sent a note to Dr. Wilkinson and Dr. Mueller informing them of our plan  -Patient is aware he will need a post-ablation MRI; post-op follow-up can be virtual if he has no issues  -Patient has PCP pre-op scheduled for later this week; will need updated labs - we will order      Sridhar Segundo MD MPHS      Today's  visit was centered around a new cancer diagnosis in the setting of additional medical comorbidities. The risk of additional morbidity or mortality with or without further treatment is high. Total time spent was 60 minutes, including but not limited to patient-facing time, chart review, review of tests/studies as noted above, and care coordination.

## 2024-02-26 NOTE — TELEPHONE ENCOUNTER
Called patient to discuss surgery scheduling with Dr. Segundo. Offered 3/26 but patient declined as he would not have a ride.  Surgery scheduled for 4/2 at [facility] with Dr. Segundo.    H&P with PCP Dr. Espinoza within 30 days of the surgery date. Patient verbalized understanding H&P is needed prior to surgery and instructed to call PCP office to schedule. Patient is aware they will get a call from the pre-op nurses prior to surgery to confirm their arrival time.    Patient will need labs prior to surgery and is requesting the lab orders be faxed to [phone] so he can have them completed at [facility].    Patient is aware he needs a post-op MRI. He is requesting that order be faxed to [phone] so he can complete it at [facility]    Post-op scheduled for 4/15 with Dr. Segundo in [place].    Writer will mail surgery packet.    No further questions/concerns at this time.    Regina Vernon on 2/26/2024 at 9:50 AM

## 2024-02-27 ENCOUNTER — PATIENT OUTREACH (OUTPATIENT)
Dept: SURGERY | Facility: CLINIC | Age: 69
End: 2024-02-27
Payer: MEDICARE

## 2024-02-27 NOTE — PROGRESS NOTES
[facility]: Cancer Care Follow-Up Note                                    Discussion with Patient:                                                      Pre surgery teaching information sent via [employer], will await for response from patient and schedule phone call to review if needed. See assessment for details.     Surgery packet:     Will be mailed by surgery scheduler. Additional info sent via [employer]       Pre procedure labs faxed to [phone] [facility].       Dates of Treatment:                                                      Not applicable.       Assessment:                                                      Pre/Post Procedure:   Surgery/Procedure plan reviewed with patient  Planned Surgery or Procedure: Laparoscopic Microwave ablation of liver tumor  Anesthesia Type: general anesthesia  Relevant Diagnosis: Hepatocellular cancer  Preoperative Surgical Consult Date: 02/26/24  Pre-Op Physical to be completed by (e.g.: PCP, Pre-Assessment Center, etc.):: PCP  Post-op Appointment Date: 04/15/24     Education  Person Taught: patient  Learning Readiness and Ability: no barriers identified  Pre-op Care Instructions: proper use of medications, when to take, and when to hold;diet  Pre-op Infection Prevention Reviewed: pre-op CHG bathing instructions  Pre-op Planning Reviewed: how to get to procedure location  Pre-op Education/Instructions provided: how to prepare for surgery;what to expect on surgery day;surgery location specifics (map, parking, phone number);showering before surgery;eating before and after surgery  Education Outcome Evaluation: other (see comments) (awaiting response via [employer] and will arrange follow up call to discuss any questions.)        Intervention/Education provided during outreach and Follow up plans:                                                       Follow up as scheduled for post operative visit.   Follow up with post discharge phone call, date  pending discharge date.       Signature:  Saranya Owens RN

## 2024-03-28 RX ORDER — TIRZEPATIDE 2.5 MG/.5ML
2.5 INJECTION, SOLUTION SUBCUTANEOUS
COMMUNITY

## 2024-03-29 ENCOUNTER — TELEPHONE (OUTPATIENT)
Dept: SURGERY | Facility: HOSPITAL | Age: 69
End: 2024-03-29
Payer: MEDICARE

## 2024-03-29 NOTE — TELEPHONE ENCOUNTER
Called patient back and confirmed we can do a virtual post-op appointment with Dr. Segundo. Dr. Segundo will also update his pre-op physical the morning of surgery.    No further questions or concerns at this time.     Regina Vernon on 3/29/2024 at 10:35 AM

## 2024-03-29 NOTE — TELEPHONE ENCOUNTER
Called patient and discussed reschedule of surgery due to patient taking a medication prior to OR date.     Rescheduled to  at [facility].     Rescheduled post-op MRI to be on  and post-op with Dr. Segundo to be on .    Patient wanted to know if post-op could be virtual and if Dr. Segundo can update his H&P since he had it completed on 3/12 and it will be  by a few days.    Sent message to EVIE Beaver with patient questions. Will call patient back once writer gets a response. Patient understands.    Regina Vernon on 3/29/2024 at 10:21 AM

## 2024-04-15 ENCOUNTER — ANESTHESIA EVENT (OUTPATIENT)
Dept: SURGERY | Facility: CLINIC | Age: 69
End: 2024-04-15
Payer: MEDICARE

## 2024-04-16 ENCOUNTER — ANESTHESIA (OUTPATIENT)
Dept: SURGERY | Facility: CLINIC | Age: 69
End: 2024-04-16
Payer: MEDICARE

## 2024-04-16 ENCOUNTER — HOSPITAL ENCOUNTER (OUTPATIENT)
Facility: CLINIC | Age: 69
Discharge: HOME | End: 2024-04-16
Attending: SURGERY | Admitting: SURGERY
Payer: MEDICARE

## 2024-04-16 VITALS
RESPIRATION RATE: 15 BRPM | OXYGEN SATURATION: 94 % | HEIGHT: 68 IN | SYSTOLIC BLOOD PRESSURE: 155 MMHG | BODY MASS INDEX: 35.12 KG/M2 | DIASTOLIC BLOOD PRESSURE: 74 MMHG | TEMPERATURE: 98 F | HEART RATE: 80 BPM | WEIGHT: 231.7 LBS

## 2024-04-16 DIAGNOSIS — C22.0 HEPATOCELLULAR CARCINOMA (H): Primary | ICD-10-CM

## 2024-04-16 LAB
GLUCOSE BLDC GLUCOMTR-MCNC: 141 MG/DL (ref 70–99)
GLUCOSE BLDC GLUCOMTR-MCNC: 172 MG/DL (ref 70–99)

## 2024-04-16 PROCEDURE — 258N000003 HC RX IP 258 OP 636: Performed by: ANESTHESIOLOGY

## 2024-04-16 PROCEDURE — 47370 LAPARO ABLATE LIVER TUMOR RF: CPT | Mod: GC | Performed by: SURGERY

## 2024-04-16 PROCEDURE — 250N000011 HC RX IP 250 OP 636: Performed by: STUDENT IN AN ORGANIZED HEALTH CARE EDUCATION/TRAINING PROGRAM

## 2024-04-16 PROCEDURE — 250N000025 HC SEVOFLURANE, PER MIN: Performed by: SURGERY

## 2024-04-16 PROCEDURE — 47370 LAPARO ABLATE LIVER TUMOR RF: CPT | Performed by: REGISTERED NURSE

## 2024-04-16 PROCEDURE — 76940 US GUIDE TISSUE ABLATION: CPT | Mod: 26 | Performed by: SURGERY

## 2024-04-16 PROCEDURE — 250N000009 HC RX 250: Performed by: STUDENT IN AN ORGANIZED HEALTH CARE EDUCATION/TRAINING PROGRAM

## 2024-04-16 PROCEDURE — 360N000078 HC SURGERY LEVEL 5, PER MIN: Performed by: SURGERY

## 2024-04-16 PROCEDURE — 93010 ELECTROCARDIOGRAM REPORT: CPT | Performed by: INTERNAL MEDICINE

## 2024-04-16 PROCEDURE — 999N000141 HC STATISTIC PRE-PROCEDURE NURSING ASSESSMENT: Performed by: SURGERY

## 2024-04-16 PROCEDURE — 370N000017 HC ANESTHESIA TECHNICAL FEE, PER MIN: Performed by: SURGERY

## 2024-04-16 PROCEDURE — 250N000009 HC RX 250: Performed by: ANESTHESIOLOGY

## 2024-04-16 PROCEDURE — 82962 GLUCOSE BLOOD TEST: CPT

## 2024-04-16 PROCEDURE — 250N000011 HC RX IP 250 OP 636: Performed by: ANESTHESIOLOGY

## 2024-04-16 PROCEDURE — 710N000012 HC RECOVERY PHASE 2, PER MINUTE: Performed by: SURGERY

## 2024-04-16 PROCEDURE — 47370 LAPARO ABLATE LIVER TUMOR RF: CPT | Performed by: ANESTHESIOLOGY

## 2024-04-16 PROCEDURE — 250N000011 HC RX IP 250 OP 636

## 2024-04-16 PROCEDURE — 93005 ELECTROCARDIOGRAM TRACING: CPT

## 2024-04-16 PROCEDURE — 272N000001 HC OR GENERAL SUPPLY STERILE: Performed by: SURGERY

## 2024-04-16 PROCEDURE — 710N000010 HC RECOVERY PHASE 1, LEVEL 2, PER MIN: Performed by: SURGERY

## 2024-04-16 RX ORDER — SODIUM CHLORIDE, SODIUM LACTATE, POTASSIUM CHLORIDE, CALCIUM CHLORIDE 600; 310; 30; 20 MG/100ML; MG/100ML; MG/100ML; MG/100ML
INJECTION, SOLUTION INTRAVENOUS CONTINUOUS
Status: DISCONTINUED | OUTPATIENT
Start: 2024-04-16 | End: 2024-04-16 | Stop reason: HOSPADM

## 2024-04-16 RX ORDER — HALOPERIDOL 5 MG/ML
1 INJECTION INTRAMUSCULAR
Status: DISCONTINUED | OUTPATIENT
Start: 2024-04-16 | End: 2024-04-16 | Stop reason: HOSPADM

## 2024-04-16 RX ORDER — BUPIVACAINE HYDROCHLORIDE 2.5 MG/ML
INJECTION, SOLUTION EPIDURAL; INFILTRATION; INTRACAUDAL
Status: COMPLETED | OUTPATIENT
Start: 2024-04-16 | End: 2024-04-16

## 2024-04-16 RX ORDER — ONDANSETRON 4 MG/1
4 TABLET, ORALLY DISINTEGRATING ORAL EVERY 30 MIN PRN
Status: DISCONTINUED | OUTPATIENT
Start: 2024-04-16 | End: 2024-04-16 | Stop reason: HOSPADM

## 2024-04-16 RX ORDER — ENOXAPARIN SODIUM 100 MG/ML
40 INJECTION SUBCUTANEOUS
Status: COMPLETED | OUTPATIENT
Start: 2024-04-16 | End: 2024-04-16

## 2024-04-16 RX ORDER — NALOXONE HYDROCHLORIDE 0.4 MG/ML
0.4 INJECTION, SOLUTION INTRAMUSCULAR; INTRAVENOUS; SUBCUTANEOUS
Status: DISCONTINUED | OUTPATIENT
Start: 2024-04-16 | End: 2024-04-16 | Stop reason: HOSPADM

## 2024-04-16 RX ORDER — LEVOFLOXACIN 5 MG/ML
500 INJECTION, SOLUTION INTRAVENOUS ONCE
Status: COMPLETED | OUTPATIENT
Start: 2024-04-16 | End: 2024-04-16

## 2024-04-16 RX ORDER — DEXMEDETOMIDINE HYDROCHLORIDE 4 UG/ML
INJECTION, SOLUTION INTRAVENOUS
Status: COMPLETED | OUTPATIENT
Start: 2024-04-16 | End: 2024-04-16

## 2024-04-16 RX ORDER — METRONIDAZOLE 500 MG/100ML
500 INJECTION, SOLUTION INTRAVENOUS ONCE
Status: COMPLETED | OUTPATIENT
Start: 2024-04-16 | End: 2024-04-16

## 2024-04-16 RX ORDER — LABETALOL HYDROCHLORIDE 5 MG/ML
10 INJECTION, SOLUTION INTRAVENOUS
Status: DISCONTINUED | OUTPATIENT
Start: 2024-04-16 | End: 2024-04-16 | Stop reason: HOSPADM

## 2024-04-16 RX ORDER — HYDROMORPHONE HCL IN WATER/PF 6 MG/30 ML
0.2 PATIENT CONTROLLED ANALGESIA SYRINGE INTRAVENOUS EVERY 5 MIN PRN
Status: DISCONTINUED | OUTPATIENT
Start: 2024-04-16 | End: 2024-04-16 | Stop reason: HOSPADM

## 2024-04-16 RX ORDER — FENTANYL CITRATE 50 UG/ML
25-50 INJECTION, SOLUTION INTRAMUSCULAR; INTRAVENOUS
Status: DISCONTINUED | OUTPATIENT
Start: 2024-04-16 | End: 2024-04-16 | Stop reason: HOSPADM

## 2024-04-16 RX ORDER — FENTANYL CITRATE 50 UG/ML
INJECTION, SOLUTION INTRAMUSCULAR; INTRAVENOUS PRN
Status: DISCONTINUED | OUTPATIENT
Start: 2024-04-16 | End: 2024-04-16

## 2024-04-16 RX ORDER — LIDOCAINE HYDROCHLORIDE 20 MG/ML
INJECTION, SOLUTION INFILTRATION; PERINEURAL PRN
Status: DISCONTINUED | OUTPATIENT
Start: 2024-04-16 | End: 2024-04-16

## 2024-04-16 RX ORDER — ONDANSETRON 2 MG/ML
4 INJECTION INTRAMUSCULAR; INTRAVENOUS EVERY 30 MIN PRN
Status: DISCONTINUED | OUTPATIENT
Start: 2024-04-16 | End: 2024-04-16 | Stop reason: HOSPADM

## 2024-04-16 RX ORDER — ONDANSETRON 2 MG/ML
INJECTION INTRAMUSCULAR; INTRAVENOUS PRN
Status: DISCONTINUED | OUTPATIENT
Start: 2024-04-16 | End: 2024-04-16

## 2024-04-16 RX ORDER — OXYCODONE HYDROCHLORIDE 10 MG/1
10 TABLET ORAL
Status: DISCONTINUED | OUTPATIENT
Start: 2024-04-16 | End: 2024-04-16 | Stop reason: HOSPADM

## 2024-04-16 RX ORDER — FLUMAZENIL 0.1 MG/ML
0.2 INJECTION, SOLUTION INTRAVENOUS
Status: DISCONTINUED | OUTPATIENT
Start: 2024-04-16 | End: 2024-04-16 | Stop reason: HOSPADM

## 2024-04-16 RX ORDER — HYDRALAZINE HYDROCHLORIDE 20 MG/ML
2.5-5 INJECTION INTRAMUSCULAR; INTRAVENOUS EVERY 10 MIN PRN
Status: DISCONTINUED | OUTPATIENT
Start: 2024-04-16 | End: 2024-04-16 | Stop reason: HOSPADM

## 2024-04-16 RX ORDER — SODIUM CHLORIDE, SODIUM LACTATE, POTASSIUM CHLORIDE, CALCIUM CHLORIDE 600; 310; 30; 20 MG/100ML; MG/100ML; MG/100ML; MG/100ML
INJECTION, SOLUTION INTRAVENOUS CONTINUOUS PRN
Status: DISCONTINUED | OUTPATIENT
Start: 2024-04-16 | End: 2024-04-16

## 2024-04-16 RX ORDER — DEXAMETHASONE SODIUM PHOSPHATE 10 MG/ML
INJECTION, SOLUTION INTRAMUSCULAR; INTRAVENOUS
Status: COMPLETED | OUTPATIENT
Start: 2024-04-16 | End: 2024-04-16

## 2024-04-16 RX ORDER — OXYCODONE HYDROCHLORIDE 5 MG/1
5-10 TABLET ORAL EVERY 6 HOURS PRN
Qty: 30 TABLET | Refills: 0 | Status: SHIPPED | OUTPATIENT
Start: 2024-04-16

## 2024-04-16 RX ORDER — PROPOFOL 10 MG/ML
INJECTION, EMULSION INTRAVENOUS PRN
Status: DISCONTINUED | OUTPATIENT
Start: 2024-04-16 | End: 2024-04-16

## 2024-04-16 RX ORDER — FENTANYL CITRATE 50 UG/ML
25 INJECTION, SOLUTION INTRAMUSCULAR; INTRAVENOUS EVERY 5 MIN PRN
Status: DISCONTINUED | OUTPATIENT
Start: 2024-04-16 | End: 2024-04-16 | Stop reason: HOSPADM

## 2024-04-16 RX ORDER — HYDROMORPHONE HCL IN WATER/PF 6 MG/30 ML
0.4 PATIENT CONTROLLED ANALGESIA SYRINGE INTRAVENOUS EVERY 5 MIN PRN
Status: DISCONTINUED | OUTPATIENT
Start: 2024-04-16 | End: 2024-04-16 | Stop reason: HOSPADM

## 2024-04-16 RX ORDER — NALOXONE HYDROCHLORIDE 0.4 MG/ML
0.2 INJECTION, SOLUTION INTRAMUSCULAR; INTRAVENOUS; SUBCUTANEOUS
Status: DISCONTINUED | OUTPATIENT
Start: 2024-04-16 | End: 2024-04-16 | Stop reason: HOSPADM

## 2024-04-16 RX ORDER — FENTANYL CITRATE 50 UG/ML
50 INJECTION, SOLUTION INTRAMUSCULAR; INTRAVENOUS EVERY 5 MIN PRN
Status: DISCONTINUED | OUTPATIENT
Start: 2024-04-16 | End: 2024-04-16 | Stop reason: HOSPADM

## 2024-04-16 RX ORDER — NALOXONE HYDROCHLORIDE 0.4 MG/ML
0.1 INJECTION, SOLUTION INTRAMUSCULAR; INTRAVENOUS; SUBCUTANEOUS
Status: DISCONTINUED | OUTPATIENT
Start: 2024-04-16 | End: 2024-04-16 | Stop reason: HOSPADM

## 2024-04-16 RX ORDER — OXYCODONE HYDROCHLORIDE 5 MG/1
5 TABLET ORAL
Status: DISCONTINUED | OUTPATIENT
Start: 2024-04-16 | End: 2024-04-16 | Stop reason: HOSPADM

## 2024-04-16 RX ORDER — POLYETHYLENE GLYCOL 3350 17 G/17G
17 POWDER, FOR SOLUTION ORAL DAILY
Qty: 510 G | Refills: 0 | Status: SHIPPED | OUTPATIENT
Start: 2024-04-16

## 2024-04-16 RX ORDER — DEXAMETHASONE SODIUM PHOSPHATE 4 MG/ML
INJECTION, SOLUTION INTRA-ARTICULAR; INTRALESIONAL; INTRAMUSCULAR; INTRAVENOUS; SOFT TISSUE PRN
Status: DISCONTINUED | OUTPATIENT
Start: 2024-04-16 | End: 2024-04-16

## 2024-04-16 RX ADMIN — LEVOFLOXACIN 500 MG: 5 INJECTION, SOLUTION INTRAVENOUS at 12:00

## 2024-04-16 RX ADMIN — ENOXAPARIN SODIUM 40 MG: 40 INJECTION SUBCUTANEOUS at 11:47

## 2024-04-16 RX ADMIN — SODIUM CHLORIDE, POTASSIUM CHLORIDE, SODIUM LACTATE AND CALCIUM CHLORIDE: 600; 310; 30; 20 INJECTION, SOLUTION INTRAVENOUS at 12:48

## 2024-04-16 RX ADMIN — FENTANYL CITRATE 50 MCG: 50 INJECTION INTRAMUSCULAR; INTRAVENOUS at 12:06

## 2024-04-16 RX ADMIN — SUGAMMADEX 100 MG: 100 INJECTION, SOLUTION INTRAVENOUS at 13:33

## 2024-04-16 RX ADMIN — HYDRALAZINE HYDROCHLORIDE 2.5 MG: 20 INJECTION INTRAMUSCULAR; INTRAVENOUS at 14:06

## 2024-04-16 RX ADMIN — Medication 70 MG: at 11:57

## 2024-04-16 RX ADMIN — BUPIVACAINE HYDROCHLORIDE 60 ML: 2.5 INJECTION, SOLUTION EPIDURAL; INFILTRATION; INTRACAUDAL; PERINEURAL at 11:35

## 2024-04-16 RX ADMIN — FENTANYL CITRATE 25 MCG: 50 INJECTION, SOLUTION INTRAMUSCULAR; INTRAVENOUS at 14:29

## 2024-04-16 RX ADMIN — METRONIDAZOLE 500 MG: 500 INJECTION, SOLUTION INTRAVENOUS at 11:12

## 2024-04-16 RX ADMIN — Medication 10 MG: at 13:12

## 2024-04-16 RX ADMIN — LIDOCAINE HYDROCHLORIDE 100 MG: 20 INJECTION, SOLUTION INFILTRATION; PERINEURAL at 11:55

## 2024-04-16 RX ADMIN — SUGAMMADEX 100 MG: 100 INJECTION, SOLUTION INTRAVENOUS at 13:27

## 2024-04-16 RX ADMIN — HYDROMORPHONE HYDROCHLORIDE 0.2 MG: 0.2 INJECTION, SOLUTION INTRAMUSCULAR; INTRAVENOUS; SUBCUTANEOUS at 14:40

## 2024-04-16 RX ADMIN — DEXAMETHASONE SODIUM PHOSPHATE 2 MG: 10 INJECTION, SOLUTION INTRAMUSCULAR; INTRAVENOUS at 11:35

## 2024-04-16 RX ADMIN — FENTANYL CITRATE 50 MCG: 50 INJECTION, SOLUTION INTRAMUSCULAR; INTRAVENOUS at 11:28

## 2024-04-16 RX ADMIN — DEXMEDETOMIDINE 40 MCG: 100 INJECTION, SOLUTION, CONCENTRATE INTRAVENOUS at 11:35

## 2024-04-16 RX ADMIN — Medication 20 MG: at 12:15

## 2024-04-16 RX ADMIN — ONDANSETRON 4 MG: 2 INJECTION INTRAMUSCULAR; INTRAVENOUS at 13:20

## 2024-04-16 RX ADMIN — FENTANYL CITRATE 25 MCG: 50 INJECTION, SOLUTION INTRAMUSCULAR; INTRAVENOUS at 14:14

## 2024-04-16 RX ADMIN — HYDRALAZINE HYDROCHLORIDE 5 MG: 20 INJECTION INTRAMUSCULAR; INTRAVENOUS at 14:16

## 2024-04-16 RX ADMIN — DEXAMETHASONE SODIUM PHOSPHATE 8 MG: 4 INJECTION, SOLUTION INTRA-ARTICULAR; INTRALESIONAL; INTRAMUSCULAR; INTRAVENOUS; SOFT TISSUE at 12:06

## 2024-04-16 RX ADMIN — FENTANYL CITRATE 50 MCG: 50 INJECTION INTRAMUSCULAR; INTRAVENOUS at 12:40

## 2024-04-16 RX ADMIN — FENTANYL CITRATE 50 MCG: 50 INJECTION INTRAMUSCULAR; INTRAVENOUS at 13:28

## 2024-04-16 RX ADMIN — SODIUM CHLORIDE, POTASSIUM CHLORIDE, SODIUM LACTATE AND CALCIUM CHLORIDE: 600; 310; 30; 20 INJECTION, SOLUTION INTRAVENOUS at 11:53

## 2024-04-16 RX ADMIN — PROPOFOL 200 MG: 10 INJECTION, EMULSION INTRAVENOUS at 11:56

## 2024-04-16 RX ADMIN — MIDAZOLAM 1 MG: 1 INJECTION INTRAMUSCULAR; INTRAVENOUS at 11:28

## 2024-04-16 RX ADMIN — FENTANYL CITRATE 50 MCG: 50 INJECTION INTRAMUSCULAR; INTRAVENOUS at 13:19

## 2024-04-16 ASSESSMENT — ACTIVITIES OF DAILY LIVING (ADL)
ADLS_ACUITY_SCORE: 29
ADLS_ACUITY_SCORE: 29
ADLS_ACUITY_SCORE: 27
ADLS_ACUITY_SCORE: 29

## 2024-04-16 NOTE — ANESTHESIA POSTPROCEDURE EVALUATION
Patient: Dereck Boston    Procedure: Procedure(s):  Laparoscopic microwave ablation of liver tumor intraoperative ultrasound of liver       Anesthesia Type:  General    Note:  Disposition: Outpatient   Postop Pain Control: Uneventful            Sign Out: Well controlled pain   PONV: No   Neuro/Psych: Uneventful            Sign Out: Acceptable/Baseline neuro status   Airway/Respiratory: Uneventful            Sign Out: Acceptable/Baseline resp. status   CV/Hemodynamics: Uneventful            Sign Out: Acceptable CV status; No obvious hypovolemia; No obvious fluid overload   Other NRE: NONE   DID A NON-ROUTINE EVENT OCCUR? No           Last vitals:  Vitals Value Taken Time   /79 04/16/24 1350   Temp 36.6  C (97.8  F) 04/16/24 1340   Pulse 74 04/16/24 1400   Resp 10 04/16/24 1400   SpO2 96 % 04/16/24 1400   Vitals shown include unfiled device data.    Electronically Signed By: Chinedu Serrato MD  April 16, 2024  2:01 PM

## 2024-04-16 NOTE — ANESTHESIA PROCEDURE NOTES
TAP Procedure Note    Pre-Procedure   Staff -        Anesthesiologist:  Shahid Shin MD       Resident/Fellow: Eileen Villegas MD       Performed By: anesthesiologist and with residents       Procedure performed by resident/fellow/CRNA in presence of a teaching physician.         Location: pre-op       Procedure Start/Stop Times: 4/16/2024 11:35 AM and 4/16/2024 11:43 AM       Pre-Anesthestic Checklist: patient identified, IV checked, site marked, risks and benefits discussed, informed consent, monitors and equipment checked, pre-op evaluation, at physician/surgeon's request and post-op pain management  Timeout:       Correct Patient: Yes        Correct Procedure: Yes        Correct Site: Yes        Correct Position: Yes        Correct Laterality: Yes        Site Marked: Yes  Procedure Documentation  Procedure: TAP       Diagnosis: POST OPERATIVE PAIN CONTROL       Laterality: bilateral       Patient Position: supine       Patient Prep/Sterile Barriers: sterile gloves, mask       Skin prep: Chloraprep       Needle Type: insulated       Needle Gauge: 21.        Needle Length (Inches): 4        Ultrasound guided       1. Ultrasound was used to identify targeted nerve, plexus, vascular marker, or fascial plane and place a needle adjacent to it in real-time.       2. Ultrasound was used to visualize the spread of anesthetic in close proximity to the above referenced structure.       3. A permanent image is entered into the patient's record.    Assessment/Narrative         The placement was negative for: blood aspirated, painful injection and site bleeding       Paresthesias: No.       Bolus given via needle..        Secured via.        Insertion/Infusion Method: Single Shot    Medication(s) Administered   Bupivacaine 0.25% PF (Infiltration) - Infiltration   60 mL - 4/16/2024 11:35:00 AM  Dexmedetomidine 4 mcg/mL (Perineural) - Perineural   40 mcg - 4/16/2024 11:35:00 AM  Dexamethasone 10 mg/mL PF (Perineural) -  "Perineural   2 mg - 4/16/2024 11:35:00 AM  Medication Administration Time: 4/16/2024 11:35 AM      FOR [facility] ONLY:   Pain Team Contact information: please page the Pain Team Via vWise. Search \"Pain\". During daytime hours, please page the attending first. At night please page the resident first.      "

## 2024-04-16 NOTE — ANESTHESIA PREPROCEDURE EVALUATION
Anesthesia Pre-Procedure Evaluation    Patient: Dereck Boston   MRN: [id number] : 1955        Procedure : Procedure(s):  Laparoscopic microwave ablation of liver tumor intraoperative ultrasound of liver          Mr. Boston has an extensive history and is a patient of Dr. Mueller in the Hepatology clinic. He has hemochromatosis on phlebotomy, prior and resolved Hep B and Hep C, with compensated cirrhosis and prior HCC (dx'd after LR4 lesion from  MRI) s/p TACE 23.      MRI from 24 shows persistent enhancement of the treated area. He did recent see Dr. Wilkinson from IR to discuss Y90 and is meeting today to discuss surgical resection.     He has stopped drinking since Spring 2023; prior to that, 5 drinks / week.     Overall he is doing well. He did have 3 days of fatigue after his TACE previously. This lesion was TACE'd due to proximity with the colon making percutaneous ablation a challenge/risk and Y90 was not considered for similar reasons.    Past Medical History:   Diagnosis Date    Diabetes (H)     Hypercholesteremia     Hypertension     Sleep apnea       Past Surgical History:   Procedure Laterality Date    IR CHEMO EMBOLIZATION  2023    IR LIVER BIOPSY PERCUTANEOUS  2023    ORTHOPEDIC SURGERY      Broken ankle    SALIVARY GLAND SURGERY        Allergies   Allergen Reactions    Atorvastatin Other (See Comments)     Other reaction(s): *Unknown  Abnormal Liver Functions, ( is taking this at a small dose)  Abnormal Liver Functions, ( is taking this at a small dose)      Penicillins Other (See Comments) and Unknown     Other reaction(s): *Unknown - Childhood Rxn        Social History     Tobacco Use    Smoking status: Former     Current packs/day: 0.00     Types: Cigarettes     Start date:      Quit date:      Years since quittin.3    Smokeless tobacco: Never   Substance Use Topics    Alcohol use: Not Currently      Wt Readings from Last 1 Encounters:   24  "104.3 kg (230 lb)        Anesthesia Evaluation   Pt has had prior anesthetic. Type: General and MAC.        ROS/MED HX  ENT/Pulmonary:     (+) sleep apnea,                                       Neurologic:  - neg neurologic ROS     Cardiovascular:     (+)  hypertension- -   -  - -                                      METS/Exercise Tolerance: 4 - Raking leaves, gardening    Hematologic:       Musculoskeletal:  - neg musculoskeletal ROS     GI/Hepatic:     (+)           hepatitis type B, liver disease,       Renal/Genitourinary:       Endo:     (+)  type II DM,                    Psychiatric/Substance Use:  - neg psychiatric ROS   (+)   alcohol abuse      Infectious Disease: Comment: Hepatitis B      Malignancy: Comment: Liver CA  (+) Malignancy,     Other:            Physical Exam    Airway        Mallampati: III   TM distance: < 3 FB   Neck ROM: full   Mouth opening: > 3 cm    Respiratory Devices and Support         Dental       (+) Modest Abnormalities - crowns, retainers, 1 or 2 missing teeth      Cardiovascular   cardiovascular exam normal       Rhythm and rate: regular and normal     Pulmonary   pulmonary exam normal        breath sounds clear to auscultation           OUTSIDE LABS:  CBC:   Lab Results   Component Value Date    WBC 6.3 11/01/2023    WBC 7.9 09/20/2023    HGB 14.6 11/01/2023    HGB 15.4 09/20/2023    HCT 41.3 11/01/2023    HCT 43.2 09/20/2023     11/01/2023     09/20/2023     BMP:   Lab Results   Component Value Date     11/01/2023     09/20/2023    POTASSIUM 4.2 11/01/2023    POTASSIUM 4.3 09/20/2023    CHLORIDE 104 11/01/2023    CHLORIDE 101 09/20/2023    CO2 22 11/01/2023    CO2 21 (L) 09/20/2023    BUN 13.7 11/01/2023    BUN 16.7 09/20/2023    CR 0.77 11/01/2023    CR 0.83 09/20/2023     (H) 11/01/2023     (H) 09/20/2023     COAGS:   Lab Results   Component Value Date    INR 1.15 11/01/2023     POC: No results found for: \"BGM\", \"HCG\", \"HCGS\"  HEPATIC: "   Lab Results   Component Value Date    ALBUMIN 4.4 11/01/2023    PROTTOTAL 7.5 11/01/2023    ALT 32 11/01/2023    AST 35 11/01/2023    ALKPHOS 78 11/01/2023    BILITOTAL 1.0 11/01/2023     OTHER:   Lab Results   Component Value Date    MANI 9.7 11/01/2023       Anesthesia Plan    ASA Status:  3       Anesthesia Type: General.     - Airway: ETT   Induction: Intravenous.   Maintenance: Balanced.   Techniques and Equipment:     - Lines/Monitors: 2nd IV     Consents    Anesthesia Plan(s) and associated risks, benefits, and realistic alternatives discussed. Questions answered and patient/representative(s) expressed understanding.     - Discussed: Risks, Benefits and Alternatives for BOTH SEDATION and the PROCEDURE were discussed     - Discussed with:  Patient      - Extended Intubation/Ventilatory Support Discussed: Yes.      - Patient is DNR/DNI Status: No     Use of blood products discussed: Yes.     - Discussed with: Patient.     Postoperative Care    Pain management: Multi-modal analgesia.   PONV prophylaxis: Ondansetron (or other 5HT-3), Dexamethasone or Solumedrol     Comments:                Andrey Navarro MD

## 2024-04-16 NOTE — INTERVAL H&P NOTE
"I have reviewed the surgical (or preoperative) H&P that is linked to this encounter, and examined the patient. There are no significant changes    Clinical Conditions Present on Arrival:  Clinically Significant Risk Factors Present on Admission                 # Drug Induced Platelet Defect: home medication list includes an antiplatelet medication  # Obesity: Estimated body mass index is 35.23 kg/m  as calculated from the following:    Height as of this encounter: 1.727 m (5' 8\").    Weight as of this encounter: 105.1 kg (231 lb 11.3 oz).       "

## 2024-04-16 NOTE — DISCHARGE INSTRUCTIONS
Contacting your Doctor -   To contact Dr. Segundo's Office call [phone] or:  [phone] and ask for the resident on call for Interventional Radiology (answered 24 hours a day)   Emergency Department:  [facility]: [phone] 911 if you are in need of immediate or emergent help   After Anesthesia (Sleep Medicine)  What should I do after anesthesia?  You should rest and relax for the next 24 hours. Avoid risky or difficult (strenuous) activity. A responsible adult should stay with you overnight.  Don't drive or use any heavy equipment for 24 hours. Even if you feel normal, your reactions may be affected by the sleep medicine given to you.  Don't drink alcohol or make any important decisions for 24 hours.  Slowly get back to your regular diet, as you feel able.  How should I expect to feel?  It's normal to feel dizzy, light-headed, or faint for up to a full day after anesthesia or while taking pain medicine. If this happens:   Sit down for a few minutes before standing.  Have someone help you when you get up to walk or use the bathroom.  If you have nausea (feel sick to your stomach) or vomit (throw up):   Drink clear liquids (such as apple juice, ginger ale, broth, or 7UP) until you feel better.  If you feel sick to your stomach, or you keep vomiting for 24 hours, please call the doctor.  What else should I know?  You might have a dry mouth, sore throat, muscle aches, or trouble sleeping. These should go away after 24 hours.  Please contact your doctor if you have any other symptoms that concern you, such as fever, pain, bleeding, fluid drainage, swelling, or headache, or if it's been over 8 to 10 hours and you still aren't able to pee (urinate).  If you have a history of sleep apnea, it's very important to use your CPAP machine for the next 24 hours when you nap or sleep.   For informational purposes only. Not to replace the advice of your health care provider. Copyright   2023 [employer]. All rights reserved. Clinically reviewed by Kelechi Mullins MD. [employer] [id number] - REV 09/23.

## 2024-04-16 NOTE — OR NURSING
Patient noted to have a 1 degree AV block on his PACU EKG.  12 lead EKG ordered, which confirmed block.  Dr Flores notified of findings and no intervention ordered.

## 2024-04-16 NOTE — BRIEF OP NOTE
[facility] [place]    Brief Operative Note    Pre-operative diagnosis: Hepatocellular carcinoma (H) [C22.0]  Post-operative diagnosis Same as pre-operative diagnosis    Procedure: Laparoscopic microwave ablation of liver tumor intraoperative ultrasound of liver, N/A - Abdomen    Surgeon: Surgeons and Role:     * Sridhar Segundo MD - Primary  Anesthesia: General with Block   Estimated Blood Loss: Less than 10 ml    Drains: None  Specimens: * No specimens in log *  Findings:   No peritoneal lesions noted. Small amount of ascites.  Microwave ablation of tumor under direct visualization and ultrasound guidance.  .  Complications: None.  Implants: * No implants in log *

## 2024-04-16 NOTE — ANESTHESIA CARE TRANSFER NOTE
Patient: Dereck Boston    Procedure: Procedure(s):  Laparoscopic microwave ablation of liver tumor intraoperative ultrasound of liver       Diagnosis: Hepatocellular carcinoma (H) [C22.0]  Diagnosis Additional Information: No value filed.    Anesthesia Type:   General     Note:    Oropharynx: oropharynx clear of all foreign objects and spontaneously breathing  Level of Consciousness: awake  Oxygen Supplementation: face mask  Level of Supplemental Oxygen (L/min / FiO2): 6  Independent Airway: airway patency satisfactory and stable  Dentition: dentition unchanged  Vital Signs Stable: post-procedure vital signs reviewed and stable  Report to RN Given: handoff report given  Patient transferred to: PACU    Handoff Report: Identifed the Patient, Identified the Reponsible Provider, Reviewed the pertinent medical history, Discussed the surgical course, Reviewed Intra-OP anesthesia mangement and issues during anesthesia, Set expectations for post-procedure period and Allowed opportunity for questions and acknowledgement of understanding  Vitals:  Vitals Value Taken Time   /72 04/16/24 1339   Temp     Pulse 78 04/16/24 1344   Resp 14 04/16/24 1344   SpO2 96 % 04/16/24 1344   Vitals shown include unfiled device data.    Electronically Signed By: JOSE Nagel CRNA  April 16, 2024  1:44 PM

## 2024-04-16 NOTE — OP NOTE
Department of Surgery  Division of Surgical Oncology    Operative Report    Date of Procedure: 4/16/2024     Surgeon: Sridhar Segundo MD MPHS     First Assistant: Cinthya Flores MD - Surgery Resident    Preoperative Diagnosis:   1. Hepatocellular carcinoma  2. Cirrhosis secondary to hemochromatosis and prior viral hepatitis  3. Diabetes    See below for additional dpqsqqn-mi-uxyqysuqt diagnoses    Postoperative Diagnosis:   1. Same for all    Procedures:   1. Diagnostic laparoscopy   2. Diagnostic intraoperative hepatic ultrasound  3. Percutaneous microwave ablation of liver tumor x1    Anesthesia: General.     Estimated Blood Loss: 5 mL.     Specimens: None    Indication:  Dereck Boston is a 68 year old male found to have cirrhosis secondary to hemochromatosis and prior vital hepatitis.Imaging showed a concerning lesion in November and TACE was performed. He has had continued peripheral enhancement of this lesion consistent with possible viable disease (LR-TR equivocal) so a multidisciplinary decision was made to proceed with additional treatment. After meeting with me he opted for ablation rather than repeat TACE. The patient agreed to the above procedures and was taken to the operating room for planned resection after signing informed consent.     Key Findings:  Cirrhotic liver, trace ascites  Visually identified capsular lesion of interest   LR3 lesion not seen - innumerable regenerative nodules    Operative Details:   Dereck Boston  was brought to the operating room, placed supine on the operating table, and was sedated and intubated per Anesthesia protocol. SCDs and Sunshine catheter were placed and pressure points were padded. The patient received preoperative antibiotics as well as subcutaneous lovenox and was prepped and draped in the usual sterile fashion. Time-out was performed to review the operative plan and confirm agreement between the consent form, patient identifiers, and relevant imaging.      Veress needle was used to gain entry, successful after 1 attempt(s). A 5mm optical entry port was inserted and confirmed no injury from the veress needle, which was then removed. Diagnostic laparoscopy was performed. There was no contraindications to proceeding. The liver was shrunken, stiff, and very nodular. There was trace ascites in the peritoneum.    A 12mm port was placed laterally. Intraoperative diagnostic hepatic ultrasound was performed. There were innumerable regenerative nodules. The LR-3 lesion in segment 8 was possibly identified but looked no different than many other regenerative nodules in that area, so we did not treat. After moving around the pericolonic and retroperitoneal fat, his HCC was easily seen on the surface of the liver and confirmed under ultrasound.    Ablation details:  #1  Segment: 5/6  Tumor size: 1.5cm  Probe: PRXT 20  Number of probes: 2  Duration of burn: 10  Wattage: 65W  Corresponded to imaging (type, date, series, image): MRI, 1/16/2024, 25, 66  Completion: tract ablation plus surface electrocautery    The abdominal cavity was visualized to ensure appropriate hemostasis. The abdomen was desufflated, ports were withdrawn, and skin was reapproximated.    The patient tolerated the procedure well, was extubated in the operating room, and was transferred to recovery room in stable condition. I was present throughout the entirety of the case. I personally updated he patient's  to inform them of the intraoperative findings, relevant procedural details, and expected recovery.      Sridhar Segundo MD MPHS      Additional hjwwoiy-yz-yfjrpniaz diagnoses:  Clinically Significant Risk Factors Present on Admission                # Drug Induced Platelet Defect: home medication list includes an antiplatelet medication   # Hypertension: Home medication list includes antihypertensive(s)      # Obesity: Estimated body mass index is 35.23 kg/m  as calculated from the  "following:    Height as of this encounter: 1.727 m (5' 8\").    Weight as of this encounter: 105.1 kg (231 lb 11.3 oz).                  "

## 2024-04-16 NOTE — ANESTHESIA PROCEDURE NOTES
Airway       Patient location during procedure: OR       Procedure Start/Stop Times: 4/16/2024 11:59 AM  Staff -        CRNA: Marleyn Love APRN CRNA       Performed By: CRNAIndications and Patient Condition       Indications for airway management: isabella-procedural       Induction type:intravenous       Mask difficulty assessment: 2 - vent by mask + OA or adjuvant +/- NMBA    Final Airway Details       Final airway type: endotracheal airway       Successful airway: ETT - single  Endotracheal Airway Details        ETT size (mm): 7.5       Cuffed: yes       Successful intubation technique: direct laryngoscopy       DL Blade Type: Matson 2       Grade View of Cords: 1       Adjucts: stylet       Position: Right       Measured from: gums/teeth       Secured at (cm): 22       Bite block used: None    Post intubation assessment        Placement verified by: capnometry, equal breath sounds and chest rise        Number of attempts at approach: 1       Number of other approaches attempted: 0       Secured with: tape       Ease of procedure: easy       Dentition: Intact and Unchanged    Medication(s) Administered   Medication Administration Time: 4/16/2024 11:59 AM

## 2024-04-17 ENCOUNTER — PATIENT OUTREACH (OUTPATIENT)
Dept: SURGERY | Facility: CLINIC | Age: 69
End: 2024-04-17
Payer: MEDICARE

## 2024-04-17 LAB
ATRIAL RATE - MUSE: 76 BPM
DIASTOLIC BLOOD PRESSURE - MUSE: NORMAL MMHG
INTERPRETATION ECG - MUSE: NORMAL
P AXIS - MUSE: 2 DEGREES
PR INTERVAL - MUSE: 238 MS
QRS DURATION - MUSE: 104 MS
QT - MUSE: 418 MS
QTC - MUSE: 470 MS
R AXIS - MUSE: -32 DEGREES
SYSTOLIC BLOOD PRESSURE - MUSE: NORMAL MMHG
T AXIS - MUSE: 14 DEGREES
VENTRICULAR RATE- MUSE: 76 BPM

## 2024-04-17 NOTE — PROGRESS NOTES
[facility]: Post-Discharge Note  SITUATION                                                      Admission:    Admission Date: 04/16/24      Discharge:   Discharge Date: 04/16/24  Discharge Diagnosis: HCC      BACKGROUND                                                      Per hospital discharge summary and inpatient provider notes.    Surgery:  Laparoscopic liver ablation of tumors         ASSESSMENT        Immediate Concerns: None at this time.     Pain:  No concerns with pain management.   Using pain medications as recommended with appropriate relief.     Incision:   No concerns, healing well, no redness, drainage or edema reported.     Drains:   No drain.     Diet:   Regular diet   Denies nausea and vomiting.     Bowels:   Passing gas.     Activity:   No difficulty with ADLs reported.   Patient is up independently at home.     Discharge Assessment  How are you doing now that you are home?: Overall doing well, pain level is low and just sore.  How are your symptoms? (Red Flag symptoms escalate to triage hotline per guidelines): Improved  Do you feel your condition is stable enough to be safe at home until your provider visit?: Yes  Does the patient have their discharge instructions? : Yes  Does the patient have questions regarding their discharge instructions? : No  Were you started on any new medications or were there changes to any of your previous medications? : Yes  Does the patient have all of their medications?: Yes  Do you have questions regarding any of your medications? : No  Discharge follow-up appointment scheduled within 14 calendar days? : Yes  Discharge Follow Up Appointment Date: 04/29/24  Discharge Follow Up Appointment Scheduled with?: Specialty Care Provider    Post-op ([facility] Only)  If the patient had a surgery or procedure, do they have any questions for a nurse?: No    Post-op (Clinicians Only)  Did the patient have surgery or a procedure: Yes  Incision: closed  Drainage: No  Bleeding:  none  Fever: No  Chills: No  Redness: No  Warmth: No  Swelling: No  Incision site pain: No  Closure: dissolving  Eating & Drinking: eating and drinking without complaints/concerns  PO Intake: regular diet  Bowel Function: normal  Urinary Status: voiding without complaint/concerns        PLAN                                                      Post op/follow up plans:   Post op appointment scheduled,confirmed date and time with patient.       Future Appointments   Date Time Provider Department [place]   4/23/2024  2:30 PM [address]   4/29/2024  9:45 AM Sridhar Segundo MD [facility]   8/9/2024 11:30 AM Mary Mueller MD [facility]           For any urgent concerns, please contact our 24 hour clinic line:   [place]: [phone]       Patient has our direct number for any questions or concerns that may arise.      Saranya Owens RN, BSN  Care Coordinator - Surgical Oncology and Hepatobiliary Surgery

## 2024-04-23 ENCOUNTER — HOSPITAL ENCOUNTER (OUTPATIENT)
Dept: MRI IMAGING | Facility: CLINIC | Age: 69
Discharge: HOME | End: 2024-04-23
Attending: SURGERY | Admitting: SURGERY
Payer: MEDICARE

## 2024-04-23 DIAGNOSIS — C22.0 HEPATOCELLULAR CARCINOMA (H): ICD-10-CM

## 2024-04-23 PROCEDURE — A9581 GADOXETATE DISODIUM INJ: HCPCS | Performed by: SURGERY

## 2024-04-23 PROCEDURE — 74183 MRI ABD W/O CNTR FLWD CNTR: CPT | Mod: MG

## 2024-04-23 PROCEDURE — 255N000002 HC RX 255 OP 636: Performed by: SURGERY

## 2024-04-23 RX ADMIN — GADOXETATE DISODIUM 10 ML: 181.43 INJECTION, SOLUTION INTRAVENOUS at 15:07

## 2024-04-23 RX ADMIN — GADOXETATE DISODIUM 10 ML: 181.43 INJECTION, SOLUTION INTRAVENOUS at 15:06

## 2024-04-25 ENCOUNTER — DOCUMENTATION ONLY (OUTPATIENT)
Dept: OTHER | Facility: CLINIC | Age: 69
End: 2024-04-25
Payer: MEDICARE

## 2024-04-29 ENCOUNTER — VIRTUAL VISIT (OUTPATIENT)
Dept: SURGERY | Facility: CLINIC | Age: 69
End: 2024-04-29
Attending: SURGERY
Payer: MEDICARE

## 2024-04-29 VITALS — HEIGHT: 67 IN | WEIGHT: 230 LBS | BODY MASS INDEX: 36.1 KG/M2

## 2024-04-29 DIAGNOSIS — C22.0 HEPATOCELLULAR CARCINOMA (H): Primary | ICD-10-CM

## 2024-04-29 PROCEDURE — 99024 POSTOP FOLLOW-UP VISIT: CPT | Mod: 95 | Performed by: SURGERY

## 2024-04-29 ASSESSMENT — PAIN SCALES - GENERAL: PAINLEVEL: NO PAIN (0)

## 2024-04-29 NOTE — NURSING NOTE
Is the patient currently in the state of [place]? YES    Visit mode:VIDEO    If the visit is dropped, the patient can be reconnected by: VIDEO VISIT: Send to e-mail at: [email or web address]    Will anyone else be joining the visit? NO  (If patient encounters technical issues they should call [phone])    How would you like to obtain your AVS? MyChart    Are changes needed to the allergy or medication list? No    Reason for visit: Video Visit (Follow Up )    Latesha KENDRICK

## 2024-04-29 NOTE — LETTER
4/29/2024         RE: Dereck Boston  [id number] [place] [address]        Dear Colleague,    Thank you for referring your patient, Dereck Boston, to the [facility]. Please see a copy of my visit note below.    Virtual Visit Details    Type of service:  Video Visit   Video Start Time:  945  Video End Time: 955    Originating Location (pt. Location): Home    Distant Location (provider location):  On-site  Platform used for Video Visit: [facility]  Department of Surgery  Division of Surgical Oncology    Post-Operative Follow-Up  Apr 29, 2024    Dereck Boston is a 68 year old male who is s/p lap ablation x1 on 4/16/24 for HCC.        History of Present Illness  Dereck is doing well since surgery. He is slowly increasing his activity. No lingering pain or fevers.    Results Reviewed:  MRI abdomen 4/23/24                                                                   IMPRESSION:   1.  Cirrhotic liver with stigmata of portal hypertension including  trace perihepatic ascites and splenomegaly.  2.  Interval ablation of the previously seen LR-TR equivocal lesion in  the segment 5/6. No findings to suggest local recurrence or residual  disease in this region.  3.  At least 3 discrete right hepatic signal abnormalities are best  characterized as LR 3. Continued imaging surveillance is recommended  with repeat MRI in 6 months.  4.  Decreased size of the aortocaval lymph node. Attention on  follow-up is recommended.    I, Sridhar Segundo, personally reviewed the above studies.      Assessment & Plan    Dereck Boston is a 68 year old male s/p lap ablation for HCC on 4/23/24.    I showed marked pictures of his MRI that demonstrated a successful ablation in segment 5/6 with no concern for residual or viable tumor.  I did also show him these additional LR 3 lesions and use those to emphasize the importance of continued surveillance.  He has upcoming visits with Dr. Kumar for an endoscopy as well as  Dr. Mueller for clinic visit.  I will defer to them for further management of his liver disease including surveillance imaging.    Regarding his postsurgical recovery, I encouraged him to continue slowly increasing his activity until he is 4 weeks out from surgery, at which point he can participate in full  activities as tolerated.    All questions were answered to their apparent satisfaction, and contact information was provided should additional questions or concerns arise.    Follow-up as needed    Sridhar Segundo MD MPHS      Total time spent was 10 minutes, including but not limited to patient-facing time, chart review, review of tests/studies as noted above, and care coordination.

## 2024-05-09 ENCOUNTER — TELEPHONE (OUTPATIENT)
Dept: GASTROENTEROLOGY | Facility: CLINIC | Age: 69
End: 2024-05-09
Payer: MEDICARE

## 2024-05-09 NOTE — TELEPHONE ENCOUNTER
Pre visit planning completed.      Procedure details:    Patient scheduled for Upper endoscopy (EGD) on 5/23/2024.     Arrival time: 1245. Procedure time 1345    Facility location: [facility]; [address] [place] [address], [address] Floor, [place] [address]. Check in location: Main entrance at registration desk.    Sedation type: Conscious sedation     Pre op exam needed? N/A    Indication for procedure: Cirrhosis of liver without ascites, unspecified hepatic cirrhosis type (H) [K74.60]       Chart review:     Electronic implanted devices? No    Recent diagnosis of diverticulitis within the last 6 weeks? N/A    Diabetic? Yes. Diabetic medication HOLDING recommendations: Diabetic injectables: Yes- Mounjaro (Tirzepatide).  Weekly dosing of medication.  Hold 7 days before procedure.  Follow up with managing provider.       Medication review:    Anticoagulants? No    NSAIDS? No NSAID medications per patient's medication list.  RN will verify with pre-assessment call.    Other medication HOLDING recommendations:  N/A      Prep for procedure:     Prep instructions sent via [employer]         Mary Kumar RN  Endoscopy Procedure Pre Assessment RN  [phone] option 4

## 2024-05-09 NOTE — TELEPHONE ENCOUNTER
Pre assessment completed for upcoming procedure.   (Please see previous telephone encounter notes for complete details)    Patient  returned call.       Procedure details:    Arrival time and facility location reviewed.    Pre op exam needed? N/A    Designated  policy reviewed. Instructed to have someone stay 6  hours post procedure.       Medication review:    Medications reviewed. Please see supporting documentation below. Holding recommendations discussed (if applicable).       Prep for procedure:     Procedure prep instructions reviewed.        Any additional information needed:  N/A      Patient  verbalized understanding and had no questions or concerns at this time.      Mary Kumar RN  Endoscopy Procedure Pre Assessment RN  [phone] option 4

## 2024-05-22 PROBLEM — K74.69 OTHER CIRRHOSIS OF LIVER (H): Status: ACTIVE | Noted: 2024-05-22

## 2024-05-23 ENCOUNTER — HOSPITAL ENCOUNTER (OUTPATIENT)
Facility: CLINIC | Age: 69
Discharge: HOME | End: 2024-05-23
Attending: INTERNAL MEDICINE | Admitting: INTERNAL MEDICINE
Payer: MEDICARE

## 2024-05-23 VITALS
RESPIRATION RATE: 15 BRPM | DIASTOLIC BLOOD PRESSURE: 77 MMHG | OXYGEN SATURATION: 95 % | HEART RATE: 78 BPM | SYSTOLIC BLOOD PRESSURE: 120 MMHG

## 2024-05-23 DIAGNOSIS — K74.69 OTHER CIRRHOSIS OF LIVER (H): Primary | ICD-10-CM

## 2024-05-23 LAB
GLUCOSE BLDC GLUCOMTR-MCNC: 206 MG/DL (ref 70–99)
UPPER GI ENDOSCOPY: NORMAL

## 2024-05-23 PROCEDURE — 250N000011 HC RX IP 250 OP 636: Performed by: INTERNAL MEDICINE

## 2024-05-23 PROCEDURE — 43235 EGD DIAGNOSTIC BRUSH WASH: CPT | Performed by: INTERNAL MEDICINE

## 2024-05-23 PROCEDURE — 999N000099 HC STATISTIC MODERATE SEDATION < 10 MIN: Performed by: INTERNAL MEDICINE

## 2024-05-23 PROCEDURE — 82962 GLUCOSE BLOOD TEST: CPT

## 2024-05-23 RX ORDER — FLUMAZENIL 0.1 MG/ML
0.2 INJECTION, SOLUTION INTRAVENOUS
Status: DISCONTINUED | OUTPATIENT
Start: 2024-05-23 | End: 2024-05-23 | Stop reason: HOSPADM

## 2024-05-23 RX ORDER — NALOXONE HYDROCHLORIDE 0.4 MG/ML
0.4 INJECTION, SOLUTION INTRAMUSCULAR; INTRAVENOUS; SUBCUTANEOUS
Status: DISCONTINUED | OUTPATIENT
Start: 2024-05-23 | End: 2024-05-23 | Stop reason: HOSPADM

## 2024-05-23 RX ORDER — PROCHLORPERAZINE MALEATE 5 MG
5 TABLET ORAL EVERY 6 HOURS PRN
Status: DISCONTINUED | OUTPATIENT
Start: 2024-05-23 | End: 2024-05-23 | Stop reason: HOSPADM

## 2024-05-23 RX ORDER — ONDANSETRON 2 MG/ML
4 INJECTION INTRAMUSCULAR; INTRAVENOUS EVERY 6 HOURS PRN
Status: DISCONTINUED | OUTPATIENT
Start: 2024-05-23 | End: 2024-05-23 | Stop reason: HOSPADM

## 2024-05-23 RX ORDER — NALOXONE HYDROCHLORIDE 0.4 MG/ML
0.2 INJECTION, SOLUTION INTRAMUSCULAR; INTRAVENOUS; SUBCUTANEOUS
Status: DISCONTINUED | OUTPATIENT
Start: 2024-05-23 | End: 2024-05-23 | Stop reason: HOSPADM

## 2024-05-23 RX ORDER — LIDOCAINE 40 MG/G
CREAM TOPICAL
Status: DISCONTINUED | OUTPATIENT
Start: 2024-05-23 | End: 2024-05-23 | Stop reason: HOSPADM

## 2024-05-23 RX ORDER — ONDANSETRON 4 MG/1
4 TABLET, ORALLY DISINTEGRATING ORAL EVERY 6 HOURS PRN
Status: DISCONTINUED | OUTPATIENT
Start: 2024-05-23 | End: 2024-05-23 | Stop reason: HOSPADM

## 2024-05-23 RX ORDER — FENTANYL CITRATE 50 UG/ML
INJECTION, SOLUTION INTRAMUSCULAR; INTRAVENOUS PRN
Status: DISCONTINUED | OUTPATIENT
Start: 2024-05-23 | End: 2024-05-23 | Stop reason: HOSPADM

## 2024-05-23 RX ORDER — ONDANSETRON 2 MG/ML
4 INJECTION INTRAMUSCULAR; INTRAVENOUS
Status: DISCONTINUED | OUTPATIENT
Start: 2024-05-23 | End: 2024-05-23 | Stop reason: HOSPADM

## 2024-05-23 ASSESSMENT — ACTIVITIES OF DAILY LIVING (ADL)
ADLS_ACUITY_SCORE: 36
ADLS_ACUITY_SCORE: 36

## 2024-08-09 ENCOUNTER — VIRTUAL VISIT (OUTPATIENT)
Dept: GASTROENTEROLOGY | Facility: CLINIC | Age: 69
End: 2024-08-09
Attending: STUDENT IN AN ORGANIZED HEALTH CARE EDUCATION/TRAINING PROGRAM
Payer: MEDICARE

## 2024-08-09 VITALS — BODY MASS INDEX: 36.02 KG/M2 | WEIGHT: 230 LBS

## 2024-08-09 DIAGNOSIS — R79.89 ELEVATED LFTS: ICD-10-CM

## 2024-08-09 DIAGNOSIS — C22.0 HEPATOCELLULAR CARCINOMA (H): Primary | ICD-10-CM

## 2024-08-09 DIAGNOSIS — E83.119 HEMOCHROMATOSIS, UNSPECIFIED HEMOCHROMATOSIS TYPE: ICD-10-CM

## 2024-08-09 DIAGNOSIS — K74.60 CIRRHOSIS OF LIVER WITHOUT ASCITES, UNSPECIFIED HEPATIC CIRRHOSIS TYPE (H): ICD-10-CM

## 2024-08-09 PROCEDURE — 99214 OFFICE O/P EST MOD 30 MIN: CPT | Mod: 95 | Performed by: STUDENT IN AN ORGANIZED HEALTH CARE EDUCATION/TRAINING PROGRAM

## 2024-08-09 ASSESSMENT — PAIN SCALES - GENERAL: PAINLEVEL: NO PAIN (0)

## 2024-08-09 NOTE — LETTER
8/9/2024      Dereck Boston  [address] [place] [address]      Dear Colleague,    Thank you for referring your patient, Dereck Boston, to the [facility] [place]. Please see a copy of my visit note below.    [facility] Return patient Visit    Date of Visit: August 9, 2024    Reason for referral: Cirrhosis and HCC    Subjective: Mr. 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      He was diagnosed with hemochromatosis years ago after his brother was. 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 Gets it every 2-3 months and his ferritin has been > 50. He has had imaging in the past that has showed hepatomegaly and steatosis, without signs of cirrhosis. He has never had a liver bx, has not followed with hepatology or GI.     He had a RUQ US 5/2023 that showed a mass in the right lobe of the liver with background cirrhosis. This was followed up by a liver MRI that showed LR 4 lesion in the right lobe of the liver. Also showed cirrhosis with evidence of regenerative nodules. No ascites. AFP normal. MRI 8/15 showing 2.4 cm LR4 lesion. Underwent liver bx of LR 4 lesion 9/2023 - showing well differentiated HCC in the background of cirrhosis. Ablation not performed due to adjacent bowel that did not separate with hydro dissection. He underwent TACE 11/1/2023.MRI from 1/17/2024 shows persistent enhancement consistent with residual tumor. Seen by IR Dr. Wilkinson recently and discussed either Y-90 vs surgical resection. He decided on Y-90 given less invasiveness.     At today's visit, he continues to do well.  Appetite has been great and he is ice fishing often to stay active.     He reports he stopped drinking since spring 2023 was drinking 5 beers/week. No history of  treatment or DUIs.     Interval Event:  - Percutaenous ablation 2024 or previously TACE'd lesion. Did ok with this.   - MRI 2024 showing post ablation changes of segment 5/6 lesion. Showed 3 LR 3 lesions. Trace Ascites.     ROS: 14 point ROS negative except for positives noted in HPI.    PMHx:  DM  HTN  HLD  No known history of heart disease  No personal history of cancer    PSHx:  Ankle surgery for fracture  R parotid grand resection due to enlargement - thought it was cancer but it wasn't    FamHx:  No family history of liver disease, liver cancer  Brother with HC  Other brother had a liver transplant for ARLD  CVA    SocHx:  Social History     Socioeconomic History     Marital status: Single     Spouse name: Not on file     Number of children: Not on file     Years of education: Not on file     Highest education level: Not on file   Occupational History     Not on file   Tobacco Use     Smoking status: Former     Current packs/day: 0.00     Types: Cigarettes     Start date:      Quit date:      Years since quittin.6     Smokeless tobacco: Never   Vaping Use     Vaping status: Never Used   Substance and Sexual Activity     Alcohol use: Not Currently     Drug use: Not Currently     Sexual activity: Not on file   Other Topics Concern     Not on file   Social History Narrative     Not on file     Social Determinants of Health     Financial Resource Strain: Not at Risk (3/12/2024)    Received from [facility]    Financial Resource Strain      Financial Resource Strain: 1   Food Insecurity: Not at Risk (3/12/2024)    Received from [facility]    Food Insecurity      Food: 1   Transportation Needs: Not at Risk (3/12/2024)    Received from [facility]    Transportation Needs      Transportation: 1   Physical Activity: Not at Risk (3/12/2024)    Received from [facility]    Physical Activity      Physical Activity: 1   Stress: Not at Risk (3/12/2024)    Received from [facility]    Stress       Stress: 1   Social Connections: Not at Risk (3/12/2024)    Received from [facility]    Social Connections      Social Connections and Isolation: 1   Interpersonal Safety: Not on file   Housing Stability: Not at Risk (3/12/2024)    Received from [facility]    Housing Stability      Housin   Last drink spring 2023, would drink 5 beers/week, denies history of DUI  YOSSI - Felicity helps with care  Retired - previously worked as a  for a [employer] [place]    Medications:  Current Outpatient Medications   Medication Sig Dispense Refill     ACCU-CHEK GUIDE test strip AS DIRECTED IN VITRO ONCE A DAY E11.9 NON INSULIN       amLODIPine (NORVASC) 10 MG tablet Take 1 tablet by mouth daily       atorvastatin (LIPITOR) 10 MG tablet Take 5 mg by mouth       blood glucose monitoring (ACCU-CHEK FASTCLIX) lancets AS DIRECTED E11.9 NON INSULIN ONCE A DAY       lisinopril-hydrochlorothiazide (ZESTORETIC) 20-12.5 MG tablet Take 1 tablet by mouth daily       oxyCODONE (ROXICODONE) 5 MG tablet Take 1-2 tablets (5-10 mg) by mouth every 6 hours as needed for moderate to severe pain 30 tablet 0     polyethylene glycol (MIRALAX) 17 GM/Dose powder Take 17 g by mouth daily While taking narcotic medication 510 g 0     study - aspirin, IDS# [id number], 81 mg tablet Take 1 tablet by mouth daily       tirzepatide (MOUNJARO) 2.5 MG/0.5ML pen Inject 2.5 mg Subcutaneous every 7 days       No current facility-administered medications for this visit.     No OTCs, herbals    Allergies:  Allergies   Allergen Reactions     Atorvastatin Other (See Comments)     Other reaction(s): *Unknown  Abnormal Liver Functions, ( is taking this at a small dose)  Abnormal Liver Functions, ( is taking this at a small dose)       Penicillins Other (See Comments) and Unknown     Other reaction(s): *Unknown - Childhood Rxn         Objective:  There were no vitals taken for this visit.  Constitutional: pleasant man in NAD  Eyes: non  icteric  Respiratory: Normal respiratory excursion   MSK: normal range of motion of visualized extremities  Abd: Non distended  Skin: No jaundice  Psychiatric: normal mood and orientation    Labs: Reviewed in EHR, no recent AFP    TBR 1.0  Platelets 158,000  AFP: 7.8 not elevated.     Imaging: Reviewed in EHR    Endoscopy:    5/2024    Findings:       The examined duodenum was normal.        Mild portal hypertensive gastropathy was found in the entire examined        stomach.        Grade I varices were found in the lower third of the esophagus. They        were small in size.                                                                                    Impression:            - Normal examined duodenum.                          - Portal hypertensive gastropathy.                          - Grade I esophageal varices.                          - No specimens collected.     Independently reviewed labs and imaging.       Assessment/Plan: Mr. Boston is a 68 year old man with a history of HTN, HLD, DM, hemochromatosis on phlebotomy, resolved hepatitis B and C infection, compensated cirrhosis with a unifocal HCC here for follow up.      He had unsuccessful TACE, now s/p ablation of segment 5/6 HCC 4/2024. Has 3 LR3 lesions that need to be followed. Recommend MRI liver and labs every 3 months    Check ferritin given hemochromatosis. Has not required phlebotomy for 2 years.     Liver disease is otherwise compensated.     Discussed that if his HCC remained after the next step treatment or he were found to have additional lesions in the liver, he would likely qualify for exception points for liver transplant.  He was hesitant to get a liver transplant in the past but understands that if the above situation rises, transplant will be a curative treatment.  Discussed that his transplant work-up would include cardiac testing it is possible that we could find coronary disease that would delay his work-up. He reports having a  colonoscopy 2019 that was normal and was told to repeat in 10 years.    We also discussed that he had resolved hepatitis B and C infection.     RTC 64months.    Dr. Mary Mueller MD  Transplant Hepatology      Again, thank you for allowing me to participate in the care of your patient.        Sincerely,        Mary Mueller MD

## 2024-08-09 NOTE — PROGRESS NOTES
Video Visit  Mary  Patient at home  Provider off site  Start 11:31 AM  End 11:50 AM    [facility] Return patient Visit    Date of Visit: August 9, 2024    Reason for referral: Cirrhosis and HCC    Subjective: Mr. 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      He was diagnosed with hemochromatosis years ago after his brother was. 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 Gets it every 2-3 months and his ferritin has been > 50. He has had imaging in the past that has showed hepatomegaly and steatosis, without signs of cirrhosis. He has never had a liver bx, has not followed with hepatology or GI.     He had a RUQ US 5/2023 that showed a mass in the right lobe of the liver with background cirrhosis. This was followed up by a liver MRI that showed LR 4 lesion in the right lobe of the liver. Also showed cirrhosis with evidence of regenerative nodules. No ascites. AFP normal. MRI 8/15 showing 2.4 cm LR4 lesion. Underwent liver bx of LR 4 lesion 9/2023 - showing well differentiated HCC in the background of cirrhosis. Ablation not performed due to adjacent bowel that did not separate with hydro dissection. He underwent TACE 11/1/2023.MRI from 1/17/2024 shows persistent enhancement consistent with residual tumor. Seen by IR Dr. Wilkinson recently and discussed either Y-90 vs surgical resection. He decided on Y-90 given less invasiveness.     At today's visit, he continues to do well.  Appetite has been great and he is ice fishing often to stay active.     He reports he stopped drinking since spring 2023 was drinking 5 beers/week. No history of treatment or DUIs.     Interval Event:  - Percutaenous ablation 4/2024 or previously TACE'd lesion. Did ok with this.   - MRI 4/2024 showing post ablation  changes of segment 5/6 lesion. Showed 3 LR 3 lesions. Trace Ascites.     ROS: 14 point ROS negative except for positives noted in HPI.    PMHx:  DM  HTN  HLD  No known history of heart disease  No personal history of cancer    PSHx:  Ankle surgery for fracture  R parotid grand resection due to enlargement - thought it was cancer but it wasn't    FamHx:  No family history of liver disease, liver cancer  Brother with HC  Other brother had a liver transplant for ARLD  CVA    SocHx:  Social History     Socioeconomic History    Marital status: Single     Spouse name: Not on file    Number of children: Not on file    Years of education: Not on file    Highest education level: Not on file   Occupational History    Not on file   Tobacco Use    Smoking status: Former     Current packs/day: 0.00     Types: Cigarettes     Start date:      Quit date:      Years since quittin.6    Smokeless tobacco: Never   Vaping Use    Vaping status: Never Used   Substance and Sexual Activity    Alcohol use: Not Currently    Drug use: Not Currently    Sexual activity: Not on file   Other Topics Concern    Not on file   Social History Narrative    Not on file     Social Determinants of Health     Financial Resource Strain: Not at Risk (3/12/2024)    Received from [facility]    Financial Resource Strain     Financial Resource Strain: 1   Food Insecurity: Not at Risk (3/12/2024)    Received from [facility]    Food Insecurity     Food: 1   Transportation Needs: Not at Risk (3/12/2024)    Received from [facility]    Transportation Needs     Transportation: 1   Physical Activity: Not at Risk (3/12/2024)    Received from [facility]    Physical Activity     Physical Activity: 1   Stress: Not at Risk (3/12/2024)    Received from [facility]    Stress     Stress: 1   Social Connections: Not at Risk (3/12/2024)    Received from [facility]    Social Connections     Social Connections and Isolation: 1   Interpersonal Safety: Not on  file   Housing Stability: Not at Risk (3/12/2024)    Received from [facility]    Housing Stability     Housin   Last drink spring 2023, would drink 5 beers/week, denies history of DUI  YOSSI - Felicity helps with care  Retired - previously worked as a  for a [employer] company [place]    Medications:  Current Outpatient Medications   Medication Sig Dispense Refill    ACCU-CHEK GUIDE test strip AS DIRECTED IN VITRO ONCE A DAY E11.9 NON INSULIN      amLODIPine (NORVASC) 10 MG tablet Take 1 tablet by mouth daily      atorvastatin (LIPITOR) 10 MG tablet Take 5 mg by mouth      blood glucose monitoring (ACCU-CHEK FASTCLIX) lancets AS DIRECTED E11.9 NON INSULIN ONCE A DAY      lisinopril-hydrochlorothiazide (ZESTORETIC) 20-12.5 MG tablet Take 1 tablet by mouth daily      oxyCODONE (ROXICODONE) 5 MG tablet Take 1-2 tablets (5-10 mg) by mouth every 6 hours as needed for moderate to severe pain 30 tablet 0    polyethylene glycol (MIRALAX) 17 GM/Dose powder Take 17 g by mouth daily While taking narcotic medication 510 g 0    study - aspirin, IDS# [id number], 81 mg tablet Take 1 tablet by mouth daily      tirzepatide (MOUNJARO) 2.5 MG/0.5ML pen Inject 2.5 mg Subcutaneous every 7 days       No current facility-administered medications for this visit.     No OTCs, herbals    Allergies:  Allergies   Allergen Reactions    Atorvastatin Other (See Comments)     Other reaction(s): *Unknown  Abnormal Liver Functions, ( is taking this at a small dose)  Abnormal Liver Functions, ( is taking this at a small dose)      Penicillins Other (See Comments) and Unknown     Other reaction(s): *Unknown - Childhood Rxn         Objective:  There were no vitals taken for this visit.  Constitutional: pleasant man in NAD  Eyes: non icteric  Respiratory: Normal respiratory excursion   MSK: normal range of motion of visualized extremities  Abd: Non distended  Skin: No jaundice  Psychiatric: normal mood and  orientation    Labs: Reviewed in EHR, no recent AFP    TBR 1.0  Platelets 158,000  AFP: 7.8 not elevated.     Imaging: Reviewed in EHR    Endoscopy:    5/2024    Findings:       The examined duodenum was normal.        Mild portal hypertensive gastropathy was found in the entire examined        stomach.        Grade I varices were found in the lower third of the esophagus. They        were small in size.                                                                                    Impression:            - Normal examined duodenum.                          - Portal hypertensive gastropathy.                          - Grade I esophageal varices.                          - No specimens collected.     Independently reviewed labs and imaging.       Assessment/Plan: Mr. Boston is a 68 year old man with a history of HTN, HLD, DM, hemochromatosis on phlebotomy, resolved hepatitis B and C infection, compensated cirrhosis with a unifocal HCC here for follow up.      He had unsuccessful TACE, now s/p ablation of segment 5/6 HCC 4/2024. Has 3 LR3 lesions that need to be followed. Recommend MRI liver and labs every 3 months    Check ferritin given hemochromatosis. Has not required phlebotomy for 2 years.     Liver disease is otherwise compensated.     Discussed that if his HCC remained after the next step treatment or he were found to have additional lesions in the liver, he would likely qualify for exception points for liver transplant.  He was hesitant to get a liver transplant in the past but understands that if the above situation rises, transplant will be a curative treatment.  Discussed that his transplant work-up would include cardiac testing it is possible that we could find coronary disease that would delay his work-up. He reports having a colonoscopy 2019 that was normal and was told to repeat in 10 years.    We also discussed that he had resolved hepatitis B and C infection.     RTC 4 months.    Dr. Hernandez  Ervin WILSON  Transplant Hepatology

## 2024-08-09 NOTE — NURSING NOTE
Is the patient currently in the state of [place]? YES    Current patient location[facility] [facility] [address]    Visit mode:VIDEO    If the visit is dropped, the patient can be reconnected by: VIDEO VISIT: Text to cell phone:   Telephone Information:   Mobile [phone]       Will anyone else be joining the visit? No  (If patient encounters technical issues they should call [phone])    How would you like to obtain your AVS? MyChart    Are changes needed to the allergy or medication list? Yes pt flagged meds for removal    Are refills needed on medications prescribed by this physician? NO    Rooming Documentation: Questionnaire(s) completed.    Reason for visit: RECHECK     AROLDO Cerda

## 2024-10-08 ENCOUNTER — TELEPHONE (OUTPATIENT)
Dept: GASTROENTEROLOGY | Facility: CLINIC | Age: 69
End: 2024-10-08
Payer: MEDICARE

## 2024-10-08 NOTE — TELEPHONE ENCOUNTER
Patient confirmed scheduled appointment:     Date: 2/17/25  Time: 9:30 am virtual  Appointment Type: Return Liver  Provider: Dr. Mary Mueller  Location: [place]  Testing/imaging: Labs & MRI  Additional Notes: offered to schedule labs & MRI, pt stated he will schedule those a week prior, confirmed orders are in and pt can complete at [place]

## 2025-02-04 DIAGNOSIS — K74.60 CIRRHOSIS OF LIVER WITHOUT ASCITES, UNSPECIFIED HEPATIC CIRRHOSIS TYPE (H): Primary | ICD-10-CM

## 2025-02-13 ENCOUNTER — LAB (OUTPATIENT)
Dept: LAB | Facility: CLINIC | Age: 70
End: 2025-02-13
Payer: MEDICARE

## 2025-02-13 DIAGNOSIS — K74.60 CIRRHOSIS OF LIVER WITHOUT ASCITES, UNSPECIFIED HEPATIC CIRRHOSIS TYPE (H): ICD-10-CM

## 2025-02-13 LAB
ALBUMIN SERPL BCG-MCNC: 4.3 G/DL (ref 3.5–5.2)
ALP SERPL-CCNC: 108 U/L (ref 40–150)
ALT SERPL W P-5'-P-CCNC: 32 U/L (ref 0–70)
ANION GAP SERPL CALCULATED.3IONS-SCNC: 14 MMOL/L (ref 7–15)
AST SERPL W P-5'-P-CCNC: 31 U/L (ref 0–45)
BILIRUB DIRECT SERPL-MCNC: 0.28 MG/DL (ref 0–0.3)
BILIRUB SERPL-MCNC: 1.1 MG/DL
BUN SERPL-MCNC: 13.2 MG/DL (ref 8–23)
CALCIUM SERPL-MCNC: 9.6 MG/DL (ref 8.8–10.4)
CHLORIDE SERPL-SCNC: 104 MMOL/L (ref 98–107)
CREAT SERPL-MCNC: 0.92 MG/DL (ref 0.67–1.17)
EGFRCR SERPLBLD CKD-EPI 2021: 90 ML/MIN/1.73M2
ERYTHROCYTE [DISTWIDTH] IN BLOOD BY AUTOMATED COUNT: 13.1 % (ref 10–15)
GLUCOSE SERPL-MCNC: 106 MG/DL (ref 70–99)
HCO3 SERPL-SCNC: 21 MMOL/L (ref 22–29)
HCT VFR BLD AUTO: 39.9 % (ref 40–53)
HGB BLD-MCNC: 14.6 G/DL (ref 13.3–17.7)
INR PPP: 1.14 (ref 0.85–1.15)
MCH RBC QN AUTO: 32.8 PG (ref 26.5–33)
MCHC RBC AUTO-ENTMCNC: 36.6 G/DL (ref 31.5–36.5)
MCV RBC AUTO: 90 FL (ref 78–100)
PLATELET # BLD AUTO: 146 10E3/UL (ref 150–450)
POTASSIUM SERPL-SCNC: 3.9 MMOL/L (ref 3.4–5.3)
PROT SERPL-MCNC: 7.3 G/DL (ref 6.4–8.3)
RBC # BLD AUTO: 4.45 10E6/UL (ref 4.4–5.9)
SODIUM SERPL-SCNC: 139 MMOL/L (ref 135–145)
WBC # BLD AUTO: 6.3 10E3/UL (ref 4–11)

## 2025-02-17 ENCOUNTER — TELEPHONE (OUTPATIENT)
Dept: GASTROENTEROLOGY | Facility: CLINIC | Age: 70
End: 2025-02-17

## 2025-02-17 ENCOUNTER — VIRTUAL VISIT (OUTPATIENT)
Dept: GASTROENTEROLOGY | Facility: CLINIC | Age: 70
End: 2025-02-17
Attending: STUDENT IN AN ORGANIZED HEALTH CARE EDUCATION/TRAINING PROGRAM
Payer: MEDICARE

## 2025-02-17 VITALS — WEIGHT: 225 LBS | HEIGHT: 65 IN | BODY MASS INDEX: 37.49 KG/M2

## 2025-02-17 DIAGNOSIS — K74.60 CIRRHOSIS OF LIVER WITHOUT ASCITES, UNSPECIFIED HEPATIC CIRRHOSIS TYPE (H): ICD-10-CM

## 2025-02-17 DIAGNOSIS — E83.119 HEMOCHROMATOSIS, UNSPECIFIED HEMOCHROMATOSIS TYPE: Primary | ICD-10-CM

## 2025-02-17 DIAGNOSIS — C22.0 HEPATOCELLULAR CARCINOMA (H): ICD-10-CM

## 2025-02-17 LAB — FERRITIN SERPL-MCNC: 235 NG/ML (ref 31–409)

## 2025-02-17 PROCEDURE — 98006 SYNCH AUDIO-VIDEO EST MOD 30: CPT | Performed by: STUDENT IN AN ORGANIZED HEALTH CARE EDUCATION/TRAINING PROGRAM

## 2025-02-17 PROCEDURE — G2211 COMPLEX E/M VISIT ADD ON: HCPCS | Performed by: STUDENT IN AN ORGANIZED HEALTH CARE EDUCATION/TRAINING PROGRAM

## 2025-02-17 ASSESSMENT — PAIN SCALES - GENERAL: PAINLEVEL_OUTOF10: NO PAIN (0)

## 2025-02-17 NOTE — TELEPHONE ENCOUNTER
MRI faxed to [facility] in [place].    Lila DEL CID LPN  Hepatology Clinic     ----- Message from Mary Mueller sent at 2/17/2025  9:49 AM CST -----  Hey - can you send his MRI order to [facility] to be done 5-6/2025?

## 2025-02-17 NOTE — NURSING NOTE
Current patient location: [facility] [place] [address]    Is the patient currently in the state of [place]? YES    Visit mode: VIDEO    If the visit is dropped, the patient can be reconnected by:VIDEO VISIT: Send to e-mail at: [email or web address]    Will anyone else be joining the visit? NO  (If patient encounters technical issues they should call [phone]    Are changes needed to the allergy or medication list? No    Are refills needed on medications prescribed by this physician? NO    Rooming Documentation:  Questionnaire(s) completed    Reason for visit: DAVID KENDRICK

## 2025-02-17 NOTE — LETTER
2/17/2025      Dereck Boston  [address] [place] [address]      Dear Colleague,    Thank you for referring your patient, Dereck Boston, to the [facility] [place]. Please see a copy of my visit note below.    Virtual Visit Details    Type of service:  Video Visit   Video Start Time: 9:28 AM  Video End Time:9:45 AM    Originating Location (pt. Location): Home  Distant Location (provider location):  On-site  Platform used for Video Visit: [facility] Return patient Visit    Date of Visit: February 17, 2025    Reason for referral: Cirrhosis and unifocal HCC    Subjective: Mr. 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      He was diagnosed with hemochromatosis years ago after his brother was. 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 Gets it every 2-3 months and his ferritin has been > 50. He has had imaging in the past that has showed hepatomegaly and steatosis, without signs of cirrhosis. He has never had a liver bx, has not followed with hepatology or GI.     He had a RUQ US 5/2023 that showed a mass in the right lobe of the liver with background cirrhosis. This was followed up by a liver MRI that showed LR 4 lesion in the right lobe of the liver. Also showed cirrhosis with evidence of regenerative nodules. No ascites. AFP normal. MRI 8/15 showing 2.4 cm LR4 lesion. Underwent liver bx of LR 4 lesion 9/2023 - showing well differentiated HCC in the background of cirrhosis. Ablation not performed due to adjacent bowel that did not separate with hydro dissection. He underwent TACE 11/1/2023.MRI from 1/17/2024 shows persistent enhancement consistent with residual tumor. Seen by IR Dr. Wilkinson recently and discussed either Y-90 vs surgical resection. He  decided on Y-90 given less invasiveness.     At today's visit, he continues to do well.  Appetite has been great and he is ice fishing often to stay active.     He reports he stopped drinking since spring 2023 was drinking 5 beers/week. No history of treatment or DUIs.     Percutaenous ablation 2024 or previously TACE'd lesion.     Interval Event:  - doing well, follow up MRIs showing no recurrent HCC, showed stable LR 3 lesions  - EGD with grade 1 EV 2024  - no liver decompensations  - doing well, DM well controlled  - has not done phlebotomy in years    ROS: 14 point ROS negative except for positives noted in HPI.    PMHx:  DM  HTN  HLD  Hemochromatosis  HCC  No known history of heart disease  No personal history of cancer    PSHx:  Ankle surgery for fracture  R parotid grand resection due to enlargement - thought it was cancer but it wasn't    FamHx:  No family history of liver disease, liver cancer  Brother with HC  Other brother had a liver transplant for ARLD  CVA    SocHx:  Social History     Socioeconomic History     Marital status: Single     Spouse name: Not on file     Number of children: Not on file     Years of education: Not on file     Highest education level: Not on file   Occupational History     Not on file   Tobacco Use     Smoking status: Former     Current packs/day: 0.00     Types: Cigarettes     Start date:      Quit date:      Years since quittin.1     Smokeless tobacco: Never   Vaping Use     Vaping status: Never Used   Substance and Sexual Activity     Alcohol use: Not Currently     Drug use: Not Currently     Sexual activity: Not on file   Other Topics Concern     Not on file   Social History Narrative     Not on file     Social Drivers of Health     Financial Resource Strain: Not at Risk (3/12/2024)    Received from [facility]    Financial Resource Strain      Financial Resource Strain: 1   Food Insecurity: Not at Risk (3/12/2024)    Received from [employer]  Insecurity      Food: 1   Transportation Needs: Not at Risk (3/12/2024)    Received from [facility]    Transportation Needs      Transportation: 1   Physical Activity: Not at Risk (3/12/2024)    Received from [facility]    Physical Activity      Physical Activity: 1   Stress: Not at Risk (3/12/2024)    Received from [facility]    Stress      Stress: 1   Social Connections: Not at Risk (3/12/2024)    Received from [facility]    Social Connections      Connectedness: 1   Interpersonal Safety: Not on file   Housing Stability: Not at Risk (3/12/2024)    Received from [facility]    Housing Stability      Housin   Last drink spring 2023, would drink 5 beers/week, denies history of DUI  YOSSI - Felicity helps with care  Retired - previously worked as a  for a [employer] company [place]    Medications:  Current Outpatient Medications   Medication Sig Dispense Refill     ACCU-CHEK GUIDE test strip AS DIRECTED IN VITRO ONCE A DAY E11.9 NON INSULIN       amLODIPine (NORVASC) 10 MG tablet Take 1 tablet by mouth daily       atorvastatin (LIPITOR) 10 MG tablet Take 5 mg by mouth       blood glucose monitoring (ACCU-CHEK FASTCLIX) lancets AS DIRECTED E11.9 NON INSULIN ONCE A DAY       lisinopril-hydrochlorothiazide (ZESTORETIC) 20-12.5 MG tablet Take 1 tablet by mouth daily       oxyCODONE (ROXICODONE) 5 MG tablet Take 1-2 tablets (5-10 mg) by mouth every 6 hours as needed for moderate to severe pain 30 tablet 0     polyethylene glycol (MIRALAX) 17 GM/Dose powder Take 17 g by mouth daily While taking narcotic medication 510 g 0     study - aspirin, IDS# [id number], 81 mg tablet Take 1 tablet by mouth daily       tirzepatide (MOUNJARO) 2.5 MG/0.5ML pen Inject 2.5 mg Subcutaneous every 7 days       tirzepatide (MOUNJARO) 7.5 MG/0.5ML pen Inject 7.5 mg subcutaneously once a week       No current facility-administered medications for this visit.     No OTCs, herbals    Allergies:  Allergies   Allergen  "Reactions     Atorvastatin Other (See Comments)     Other reaction(s): *Unknown  Abnormal Liver Functions, ( is taking this at a small dose)  Abnormal Liver Functions, ( is taking this at a small dose)       Penicillins Other (See Comments) and Unknown     Other reaction(s): *Unknown - Childhood Rxn         Objective:  Ht 1.651 m (5' 5\")   Wt 102.1 kg (225 lb)   BMI 37.44 kg/m    Constitutional: pleasant man in NAD  Eyes: non icteric  Respiratory: Normal respiratory excursion   MSK: normal range of motion of visualized extremities  Abd: Non distended  Skin: No jaundice  Psychiatric: normal mood and orientation    Labs: Reviewed in EHR, no recent AFP    TBR 1.0  Platelets 158,000  AFP: 7.8 not elevated.     Imaging: Reviewed in EHR    Endoscopy:    5/2024    Findings:       The examined duodenum was normal.        Mild portal hypertensive gastropathy was found in the entire examined        stomach.        Grade I varices were found in the lower third of the esophagus. They        were small in size.                                                                                    Impression:            - Normal examined duodenum.                          - Portal hypertensive gastropathy.                          - Grade I esophageal varices.                          - No specimens collected.     Independently reviewed labs and imaging.       Assessment/Plan: Mr. Boston is a 69 year old man with a history of HTN, HLD, DM, hemochromatosis on phlebotomy, resolved hepatitis B and C infection, compensated cirrhosis with a unifocal HCC here for follow up.      He had unsuccessful TACE, now s/p ablation of segment 5/6 HCC 4/2024. Has 3 LR3 lesions that need to be followed. Recommend MRI liver and labs every 3-4 months. Send MRI to [facility] to be done 5-6/2025    Check ferritin given hemochromatosis. Has not required phlebotomy for 2 years.     Liver disease is otherwise compensated. Ordered repeat EGD    Discussed " that if his HCC remained after the next step treatment or he were found to have additional lesions in the liver, he would likely qualify for exception points for liver transplant.  He was hesitant to get a liver transplant in the past but understands that if the above situation rises, transplant will be a curative treatment.  Discussed that his transplant work-up would include cardiac testing it is possible that we could find coronary disease that would delay his work-up. He reports having a colonoscopy 2019 that was normal and was told to repeat in 10 years.    We also discussed that he had resolved hepatitis B and C infection.     RTC 4 months.    Dr. Mary Mueller MD  Transplant Hepatology    The longitudinal plan of care for the diagnosis(es)/condition(s) as documented were addressed during this visit. Due to the added complexity in care, I will continue to support Dereck in the subsequent management and with ongoing continuity of care.      Again, thank you for allowing me to participate in the care of your patient.        Sincerely,        aMry Mueller MD    Electronically signed

## 2025-02-17 NOTE — PROGRESS NOTES
Virtual Visit Details    Type of service:  Video Visit   Video Start Time: 9:28 AM  Video End Time:9:45 AM    Originating Location (pt. Location): Home  Distant Location (provider location):  On-site  Platform used for Video Visit: [facility] Return patient Visit    Date of Visit: February 17, 2025    Reason for referral: Cirrhosis and unifocal HCC    Subjective: Mr. 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      He was diagnosed with hemochromatosis years ago after his brother was. 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 Gets it every 2-3 months and his ferritin has been > 50. He has had imaging in the past that has showed hepatomegaly and steatosis, without signs of cirrhosis. He has never had a liver bx, has not followed with hepatology or GI.     He had a RUQ US 5/2023 that showed a mass in the right lobe of the liver with background cirrhosis. This was followed up by a liver MRI that showed LR 4 lesion in the right lobe of the liver. Also showed cirrhosis with evidence of regenerative nodules. No ascites. AFP normal. MRI 8/15 showing 2.4 cm LR4 lesion. Underwent liver bx of LR 4 lesion 9/2023 - showing well differentiated HCC in the background of cirrhosis. Ablation not performed due to adjacent bowel that did not separate with hydro dissection. He underwent TACE 11/1/2023.MRI from 1/17/2024 shows persistent enhancement consistent with residual tumor. Seen by IR Dr. Wilkinson recently and discussed either Y-90 vs surgical resection. He decided on Y-90 given less invasiveness.     At today's visit, he continues to do well.  Appetite has been great and he is ice fishing often to stay active.     He reports he stopped drinking since spring 2023 was drinking 5 beers/week. No  history of treatment or DUIs.     Percutaenous ablation 2024 or previously TACE'd lesion.     Interval Event:  - doing well, follow up MRIs showing no recurrent HCC, showed stable LR 3 lesions  - EGD with grade 1 EV 2024  - no liver decompensations  - doing well, DM well controlled  - has not done phlebotomy in years    ROS: 14 point ROS negative except for positives noted in HPI.    PMHx:  DM  HTN  HLD  Hemochromatosis  HCC  No known history of heart disease  No personal history of cancer    PSHx:  Ankle surgery for fracture  R parotid grand resection due to enlargement - thought it was cancer but it wasn't    FamHx:  No family history of liver disease, liver cancer  Brother with HC  Other brother had a liver transplant for ARLD  CVA    SocHx:  Social History     Socioeconomic History    Marital status: Single     Spouse name: Not on file    Number of children: Not on file    Years of education: Not on file    Highest education level: Not on file   Occupational History    Not on file   Tobacco Use    Smoking status: Former     Current packs/day: 0.00     Types: Cigarettes     Start date:      Quit date:      Years since quittin.1    Smokeless tobacco: Never   Vaping Use    Vaping status: Never Used   Substance and Sexual Activity    Alcohol use: Not Currently    Drug use: Not Currently    Sexual activity: Not on file   Other Topics Concern    Not on file   Social History Narrative    Not on file     Social Drivers of Health     Financial Resource Strain: Not at Risk (3/12/2024)    Received from [facility]    Financial Resource Strain     Financial Resource Strain: 1   Food Insecurity: Not at Risk (3/12/2024)    Received from [facility]    Food Insecurity     Food: 1   Transportation Needs: Not at Risk (3/12/2024)    Received from [facility]    Transportation Needs     Transportation: 1   Physical Activity: Not at Risk (3/12/2024)    Received from [facility]    Physical Activity     Physical  Activity: 1   Stress: Not at Risk (3/12/2024)    Received from [facility]    Stress     Stress: 1   Social Connections: Not at Risk (3/12/2024)    Received from [facility]    Social Connections     Connectedness: 1   Interpersonal Safety: Not on file   Housing Stability: Not at Risk (3/12/2024)    Received from [facility]    Housing Stability     Housin   Last drink spring 2023, would drink 5 beers/week, denies history of DUI  YOSSI - Felicity helps with care  Retired - previously worked as a  for a [employer] company [place]    Medications:  Current Outpatient Medications   Medication Sig Dispense Refill    ACCU-CHEK GUIDE test strip AS DIRECTED IN VITRO ONCE A DAY E11.9 NON INSULIN      amLODIPine (NORVASC) 10 MG tablet Take 1 tablet by mouth daily      atorvastatin (LIPITOR) 10 MG tablet Take 5 mg by mouth      blood glucose monitoring (ACCU-CHEK FASTCLIX) lancets AS DIRECTED E11.9 NON INSULIN ONCE A DAY      lisinopril-hydrochlorothiazide (ZESTORETIC) 20-12.5 MG tablet Take 1 tablet by mouth daily      oxyCODONE (ROXICODONE) 5 MG tablet Take 1-2 tablets (5-10 mg) by mouth every 6 hours as needed for moderate to severe pain 30 tablet 0    polyethylene glycol (MIRALAX) 17 GM/Dose powder Take 17 g by mouth daily While taking narcotic medication 510 g 0    study - aspirin, IDS# [id number], 81 mg tablet Take 1 tablet by mouth daily      tirzepatide (MOUNJARO) 2.5 MG/0.5ML pen Inject 2.5 mg Subcutaneous every 7 days      tirzepatide (MOUNJARO) 7.5 MG/0.5ML pen Inject 7.5 mg subcutaneously once a week       No current facility-administered medications for this visit.     No OTCs, herbals    Allergies:  Allergies   Allergen Reactions    Atorvastatin Other (See Comments)     Other reaction(s): *Unknown  Abnormal Liver Functions, ( is taking this at a small dose)  Abnormal Liver Functions, ( is taking this at a small dose)      Penicillins Other (See Comments) and Unknown     Other  "reaction(s): *Unknown - Childhood Rxn         Objective:  Ht 1.651 m (5' 5\")   Wt 102.1 kg (225 lb)   BMI 37.44 kg/m    Constitutional: pleasant man in NAD  Eyes: non icteric  Respiratory: Normal respiratory excursion   MSK: normal range of motion of visualized extremities  Abd: Non distended  Skin: No jaundice  Psychiatric: normal mood and orientation    Labs: Reviewed in EHR, no recent AFP    TBR 1.0  Platelets 158,000  AFP: 7.8 not elevated.     Imaging: Reviewed in EHR    Endoscopy:    5/2024    Findings:       The examined duodenum was normal.        Mild portal hypertensive gastropathy was found in the entire examined        stomach.        Grade I varices were found in the lower third of the esophagus. They        were small in size.                                                                                    Impression:            - Normal examined duodenum.                          - Portal hypertensive gastropathy.                          - Grade I esophageal varices.                          - No specimens collected.     Independently reviewed labs and imaging.       Assessment/Plan: Mr. Boston is a 69 year old man with a history of HTN, HLD, DM, hemochromatosis on phlebotomy, resolved hepatitis B and C infection, compensated cirrhosis with a unifocal HCC here for follow up.      He had unsuccessful TACE, now s/p ablation of segment 5/6 HCC 4/2024. Has 3 LR3 lesions that need to be followed. Recommend MRI liver and labs every 3-4 months. Send [facility] to [facility] to be done 5-6/2025    Check ferritin given hemochromatosis. Has not required phlebotomy for 2 years.     Liver disease is otherwise compensated. Ordered repeat EGD    Discussed that if his HCC remained after the next step treatment or he were found to have additional lesions in the liver, he would likely qualify for exception points for liver transplant.  He was hesitant to get a liver transplant in the past but understands that if the " above situation rises, transplant will be a curative treatment.  Discussed that his transplant work-up would include cardiac testing it is possible that we could find coronary disease that would delay his work-up. He reports having a colonoscopy 2019 that was normal and was told to repeat in 10 years.    We also discussed that he had resolved hepatitis B and C infection.     RTC 4 months.    Dr. Mary Mueller MD  Transplant Hepatology    The longitudinal plan of care for the diagnosis(es)/condition(s) as documented were addressed during this visit. Due to the added complexity in care, I will continue to support Dereck in the subsequent management and with ongoing continuity of care.

## 2025-02-19 ENCOUNTER — TELEPHONE (OUTPATIENT)
Dept: GASTROENTEROLOGY | Facility: CLINIC | Age: 70
End: 2025-02-19
Payer: MEDICARE

## 2025-02-19 NOTE — TELEPHONE ENCOUNTER
"Endoscopy Scheduling Screen    Have you had any respiratory illness or flu-like symptoms in the last 10 days?  No    What is your communication preference for Instructions and/or Bowel Prep?   MyChart    What insurance is in the chart?  Other:  MEDICARE    Ordering/Referring Provider: Mary Mueller MD     (If ordering provider performs procedure, schedule with ordering provider unless otherwise instructed. )    BMI: Estimated body mass index is 37.44 kg/m  as calculated from the following:    Height as of 2/17/25: 1.651 m (5' 5\").    Weight as of 2/17/25: 102.1 kg (225 lb).     Sedation Ordered  Moderate Sedation    Do you have a history of malignant hyperthermia?  No  Are you taking methadone or Suboxone?  NO, No RN review required.    Have you been diagnosed and are being treated for severe PTSD or severe anxiety?  NO, No RN review required.    Are you taking any prescription medications for pain 3 or more times per week?   NO, No RN review required.    (Females) Are you currently pregnant?   No     Have you been diagnosed or told you have pulmonary hypertension?   No    Do you have an LVAD?  No    Have you been told you have moderate to severe sleep apnea?  Yes. Do you use a CPAP? Yes Where is the patient located?. (RN Review required for scheduling unless scheduling in Hospital.)     Have you been told you have COPD, asthma, or any other lung disease?  No    Do you  have a history of any heart conditions or any upcoming cardiac exams like an echo, angiogram, stress test, or ablation?  No     Have you ever had or are you waiting for an organ transplant?  No. Continue scheduling, no site restrictions.    Have you had a stroke or transient ischemic attack (TIA aka \"mini stroke\") in the last 2 years?   No.    Have you been diagnosed with or been told you have cirrhosis of the liver?   Yes. (RN Review required for scheduling unless scheduling in Hospital.)    Are you currently on dialysis?   No    Do you need " "assistance transferring?   No    BMI: Estimated body mass index is 37.44 kg/m  as calculated from the following:    Height as of 2/17/25: 1.651 m (5' 5\").    Weight as of 2/17/25: 102.1 kg (225 lb).     Is patients BMI > 40 and scheduling location [facility]?  No    Do you take an injectable or oral medication for weight loss or diabetes (excluding insulin)?  Yes, hold time can be up to 7 days. Please consult with you prescribing provider to discuss endoscopy recommendations. (Please schedule at least 7 days out.)    Do you take the medication Naltrexone?  No    Do you take blood thinners?  No       Prep   Are you currently on dialysis or do you have chronic kidney disease?  No    Do you have a diagnosis of diabetes?  Yes (Golytely Prep)    Do you have a diagnosis of cystic fibrosis (CF)?  No    On a regular basis do you go 3 -5 days between bowel movements?  No    BMI > 40?  No    Preferred Pharmacy:    [facility]/pharmacy #[id number] - [place], [place] - [address]  [address]  [place] [address]  Phone: [phone] Fax: [phone]      Final Scheduling Details     Procedure scheduled  EGD    Surgeon:  Ervin     Date of procedure:  3/27/2025     Pre-OP / PAC:   No - Not required for this site.    Location  [facility] - Per order.    Sedation   Moderate Sedation - Per order. Per RN , ok for CS if not taking pain medication 3 or more times per week      Patient Reminders:   You will receive a call from a Nurse to review instructions and health history.  This assessment must be completed prior to your procedure.  Failure to complete the Nurse assessment may result in the procedure being cancelled.      On the day of your procedure, please designate an adult(s) who can drive you home stay with you for the next 24 hours. The medicines used in the exam will make you sleepy. You will not be able to drive.      You cannot take public transportation, ride share services, or non-medical taxi service without a responsible caregiver.  " Medical transport services are allowed with the requirement that a responsible caregiver will receive you at your destination.  We require that drivers and caregivers are confirmed prior to your procedure.

## 2025-02-24 ENCOUNTER — TELEPHONE (OUTPATIENT)
Dept: GASTROENTEROLOGY | Facility: CLINIC | Age: 70
End: 2025-02-24
Payer: MEDICARE

## 2025-02-24 NOTE — TELEPHONE ENCOUNTER
Patient confirmed scheduled appointment:     Date: 8/25/25  Time: 10:00 am Virtual  Appointment Type: Return Liver  Provider: Dr. Mary Mueller  Location: [place]  Testing/imaging: Labs locally  Additional Notes:

## 2025-03-28 ENCOUNTER — HOSPITAL ENCOUNTER (OUTPATIENT)
Facility: CLINIC | Age: 70
Discharge: HOME | End: 2025-03-28
Attending: STUDENT IN AN ORGANIZED HEALTH CARE EDUCATION/TRAINING PROGRAM | Admitting: STUDENT IN AN ORGANIZED HEALTH CARE EDUCATION/TRAINING PROGRAM
Payer: MEDICARE

## 2025-03-28 VITALS
RESPIRATION RATE: 17 BRPM | OXYGEN SATURATION: 96 % | SYSTOLIC BLOOD PRESSURE: 135 MMHG | HEART RATE: 76 BPM | DIASTOLIC BLOOD PRESSURE: 79 MMHG

## 2025-03-28 LAB
GLUCOSE BLDC GLUCOMTR-MCNC: 141 MG/DL (ref 70–99)
UPPER GI ENDOSCOPY: NORMAL

## 2025-03-28 PROCEDURE — 88305 TISSUE EXAM BY PATHOLOGIST: CPT | Mod: 26 | Performed by: PATHOLOGY

## 2025-03-28 PROCEDURE — 82962 GLUCOSE BLOOD TEST: CPT

## 2025-03-28 PROCEDURE — 250N000011 HC RX IP 250 OP 636: Performed by: STUDENT IN AN ORGANIZED HEALTH CARE EDUCATION/TRAINING PROGRAM

## 2025-03-28 PROCEDURE — G0500 MOD SEDAT ENDO SERVICE >5YRS: HCPCS | Performed by: STUDENT IN AN ORGANIZED HEALTH CARE EDUCATION/TRAINING PROGRAM

## 2025-03-28 PROCEDURE — 43239 EGD BIOPSY SINGLE/MULTIPLE: CPT | Performed by: STUDENT IN AN ORGANIZED HEALTH CARE EDUCATION/TRAINING PROGRAM

## 2025-03-28 PROCEDURE — 88305 TISSUE EXAM BY PATHOLOGIST: CPT | Mod: TC | Performed by: STUDENT IN AN ORGANIZED HEALTH CARE EDUCATION/TRAINING PROGRAM

## 2025-03-28 RX ORDER — FENTANYL CITRATE 50 UG/ML
INJECTION, SOLUTION INTRAMUSCULAR; INTRAVENOUS PRN
Status: DISCONTINUED | OUTPATIENT
Start: 2025-03-28 | End: 2025-03-28 | Stop reason: HOSPADM

## 2025-03-28 RX ORDER — ONDANSETRON 2 MG/ML
4 INJECTION INTRAMUSCULAR; INTRAVENOUS
Status: DISCONTINUED | OUTPATIENT
Start: 2025-03-28 | End: 2025-03-28 | Stop reason: HOSPADM

## 2025-03-28 RX ORDER — LIDOCAINE 40 MG/G
CREAM TOPICAL
Status: DISCONTINUED | OUTPATIENT
Start: 2025-03-28 | End: 2025-03-28 | Stop reason: HOSPADM

## 2025-03-28 ASSESSMENT — ACTIVITIES OF DAILY LIVING (ADL)
ADLS_ACUITY_SCORE: 45
ADLS_ACUITY_SCORE: 45

## 2025-03-28 NOTE — H&P
Dereck KLEIN [facility]  [id number]  male  69 year old      Reason for procedure/surgery: Egd to follow up varices    Patient Active Problem List   Diagnosis    Other cirrhosis of liver (H)       Past Surgical History:    Past Surgical History:   Procedure Laterality Date    ESOPHAGOSCOPY, GASTROSCOPY, DUODENOSCOPY (EGD), COMBINED N/A 2024    Procedure: Esophagoscopy, gastroscopy, duodenoscopy (EGD), combined;  Surgeon: Angela Kumar MD;  Location:  GI    IR CHEMO EMBOLIZATION  2023    IR LIVER BIOPSY PERCUTANEOUS  2023    LAPAROSCOPIC ABLATION LIVER TUMOR N/A 2024    Procedure: Laparoscopic microwave ablation of liver tumor intraoperative ultrasound of liver;  Surgeon: Sridhar Segundo MD;  Location:  OR    ORTHOPEDIC SURGERY      Broken ankle    SALIVARY GLAND SURGERY         Past Medical History:   Past Medical History:   Diagnosis Date    Diabetes (H)     Hypercholesteremia     Hypertension     Sleep apnea        Social History:   Social History     Tobacco Use    Smoking status: Former     Current packs/day: 0.00     Types: Cigarettes     Start date:      Quit date:      Years since quittin.2    Smokeless tobacco: Never   Substance Use Topics    Alcohol use: Not Currently       Family History: No family history on file.    Allergies:   Allergies   Allergen Reactions    Atorvastatin Other (See Comments)     Other reaction(s): *Unknown  Abnormal Liver Functions, ( is taking this at a small dose)  Abnormal Liver Functions, ( is taking this at a small dose)      Penicillins Other (See Comments) and Unknown     Other reaction(s): *Unknown - Childhood Rxn         Active Medications:   No current outpatient medications on file.       Systemic Review:   CONSTITUTIONAL: NEGATIVE for fever, chills, change in weight  ENT/MOUTH: NEGATIVE for ear, mouth and throat problems  RESP: NEGATIVE for significant cough or SOB  CV: NEGATIVE for chest pain, palpitations or peripheral  edema    Physical Examination:   Vital Signs: There were no vitals taken for this visit.  GENERAL: healthy, alert and no distress  NECK: no adenopathy, no asymmetry, masses, or scars  RESP: lungs clear to auscultation - no rales, rhonchi or wheezes  CV: regular rate and rhythm, normal S1 S2, no S3 or S4, no murmur, click or rub, no peripheral edema and peripheral pulses strong  ABDOMEN: soft, nontender, no hepatosplenomegaly, no masses and bowel sounds normal  MS: no gross musculoskeletal defects noted, no edema      Plan: Appropriate to proceed as scheduled.      Mary Mueller MD  3/28/2025    PCP:  Roberto Foote

## 2025-03-31 LAB
PATH REPORT.COMMENTS IMP SPEC: NORMAL
PATH REPORT.COMMENTS IMP SPEC: NORMAL
PATH REPORT.FINAL DX SPEC: NORMAL
PATH REPORT.GROSS SPEC: NORMAL
PATH REPORT.MICROSCOPIC SPEC OTHER STN: NORMAL
PATH REPORT.RELEVANT HX SPEC: NORMAL
PHOTO IMAGE: NORMAL

## 2025-05-30 ENCOUNTER — HOSPITAL ENCOUNTER (OUTPATIENT)
Dept: GENERAL RADIOLOGY | Facility: CLINIC | Age: 70
Discharge: HOME | End: 2025-05-30
Attending: STUDENT IN AN ORGANIZED HEALTH CARE EDUCATION/TRAINING PROGRAM
Payer: MEDICARE

## 2025-05-30 ENCOUNTER — MYC MEDICAL ADVICE (OUTPATIENT)
Dept: GASTROENTEROLOGY | Facility: CLINIC | Age: 70
End: 2025-05-30
Payer: MEDICARE

## 2025-05-30 PROCEDURE — 74183 MRI ABD W/O CNTR FLWD CNTR: CPT | Performed by: RADIOLOGY

## 2025-05-30 PROCEDURE — 999N000122 MR MHEALTH OVERREAD

## 2025-06-04 ENCOUNTER — RESULTS FOLLOW-UP (OUTPATIENT)
Dept: MULTI SPECIALTY CLINIC | Facility: CLINIC | Age: 70
End: 2025-06-04

## 2025-08-05 DIAGNOSIS — K74.60 CIRRHOSIS OF LIVER WITHOUT ASCITES, UNSPECIFIED HEPATIC CIRRHOSIS TYPE (H): Primary | ICD-10-CM

## 2025-08-14 ENCOUNTER — TELEPHONE (OUTPATIENT)
Dept: GASTROENTEROLOGY | Facility: CLINIC | Age: 70
End: 2025-08-14
Payer: MEDICARE

## 2025-08-19 ENCOUNTER — TRANSFERRED RECORDS (OUTPATIENT)
Dept: HEALTH INFORMATION MANAGEMENT | Facility: CLINIC | Age: 70
End: 2025-08-19
Payer: MEDICARE

## 2025-08-25 ENCOUNTER — VIRTUAL VISIT (OUTPATIENT)
Dept: GASTROENTEROLOGY | Facility: CLINIC | Age: 70
End: 2025-08-25
Attending: STUDENT IN AN ORGANIZED HEALTH CARE EDUCATION/TRAINING PROGRAM
Payer: MEDICARE

## 2025-08-25 ENCOUNTER — HOSPITAL ENCOUNTER (OUTPATIENT)
Dept: GENERAL RADIOLOGY | Facility: CLINIC | Age: 70
Discharge: HOME | End: 2025-08-25
Attending: STUDENT IN AN ORGANIZED HEALTH CARE EDUCATION/TRAINING PROGRAM
Payer: MEDICARE

## 2025-08-25 DIAGNOSIS — C22.0 HEPATOCELLULAR CARCINOMA (H): Primary | ICD-10-CM

## 2025-08-25 DIAGNOSIS — K74.60 CIRRHOSIS OF LIVER WITHOUT ASCITES, UNSPECIFIED HEPATIC CIRRHOSIS TYPE (H): ICD-10-CM

## 2025-08-25 DIAGNOSIS — E83.119 HEMOCHROMATOSIS, UNSPECIFIED HEMOCHROMATOSIS TYPE: ICD-10-CM

## 2025-08-25 DIAGNOSIS — C22.0 HEPATOCELLULAR CARCINOMA (H): ICD-10-CM

## 2025-08-25 PROCEDURE — 999N000122 MR MHEALTH OVERREAD

## 2025-08-25 PROCEDURE — G2211 COMPLEX E/M VISIT ADD ON: HCPCS | Mod: 95 | Performed by: STUDENT IN AN ORGANIZED HEALTH CARE EDUCATION/TRAINING PROGRAM

## 2025-08-25 PROCEDURE — 1126F AMNT PAIN NOTED NONE PRSNT: CPT | Mod: 95 | Performed by: STUDENT IN AN ORGANIZED HEALTH CARE EDUCATION/TRAINING PROGRAM

## 2025-08-25 PROCEDURE — 98004 SYNCH AUDIO-VIDEO EST SF 10: CPT | Performed by: STUDENT IN AN ORGANIZED HEALTH CARE EDUCATION/TRAINING PROGRAM

## 2025-08-26 ENCOUNTER — TELEPHONE (OUTPATIENT)
Dept: GASTROENTEROLOGY | Facility: CLINIC | Age: 70
End: 2025-08-26
Payer: MEDICARE

## 2025-08-29 ENCOUNTER — REFERRAL (OUTPATIENT)
Dept: TRANSPLANT | Facility: CLINIC | Age: 70
End: 2025-08-29
Payer: MEDICARE

## 2025-08-29 DIAGNOSIS — Z11.59 ENCOUNTER FOR SCREENING FOR OTHER VIRAL DISEASES: ICD-10-CM

## 2025-08-29 DIAGNOSIS — Z79.52 LONG TERM (CURRENT) USE OF SYSTEMIC STEROIDS: ICD-10-CM

## 2025-08-29 DIAGNOSIS — R94.39 ABNORMAL RESULT OF OTHER CARDIOVASCULAR FUNCTION STUDY: ICD-10-CM

## 2025-08-29 DIAGNOSIS — R79.9 ABNORMAL FINDING OF BLOOD CHEMISTRY, UNSPECIFIED: ICD-10-CM

## 2025-08-29 DIAGNOSIS — K74.69 OTHER CIRRHOSIS OF LIVER (H): ICD-10-CM

## 2025-08-29 DIAGNOSIS — Z12.5 ENCOUNTER FOR SCREENING FOR MALIGNANT NEOPLASM OF PROSTATE: ICD-10-CM

## 2025-08-29 DIAGNOSIS — R40.0 SOMNOLENCE: ICD-10-CM

## 2025-08-29 DIAGNOSIS — C22.0 HCC (HEPATOCELLULAR CARCINOMA) (H): Primary | ICD-10-CM

## 2025-08-29 DIAGNOSIS — R94.31 ABNORMAL ELECTROCARDIOGRAM (ECG) (EKG): ICD-10-CM

## 2025-08-29 DIAGNOSIS — R93.89 ABNORMAL FINDINGS ON DIAGNOSTIC IMAGING OF OTHER SPECIFIED BODY STRUCTURES: ICD-10-CM

## 2025-08-29 PROBLEM — K74.60 LIVER CIRRHOSIS (H): Status: ACTIVE | Noted: 2023-10-26

## 2025-08-29 PROBLEM — K74.60 ESOPHAGEAL VARICES IN CIRRHOSIS (H): Chronic | Status: ACTIVE | Noted: 2025-08-28

## 2025-08-29 PROBLEM — E11.9 TYPE 2 DIABETES MELLITUS WITHOUT COMPLICATIONS (H): Status: ACTIVE | Noted: 2025-08-29

## 2025-08-29 PROBLEM — I10 ESSENTIAL HYPERTENSION: Status: ACTIVE | Noted: 2023-04-26

## 2025-08-29 PROBLEM — I85.10 ESOPHAGEAL VARICES IN CIRRHOSIS (H): Chronic | Status: ACTIVE | Noted: 2025-08-28

## 2025-08-31 ENCOUNTER — HEALTH MAINTENANCE LETTER (OUTPATIENT)
Age: 70
End: 2025-08-31

## 2025-09-02 VITALS — WEIGHT: 193 LBS | HEIGHT: 68 IN | BODY MASS INDEX: 29.25 KG/M2

## 2025-09-04 ENCOUNTER — TELEPHONE (OUTPATIENT)
Dept: GASTROENTEROLOGY | Facility: CLINIC | Age: 70
End: 2025-09-04
Payer: MEDICARE

## 2025-09-04 ENCOUNTER — MYC MEDICAL ADVICE (OUTPATIENT)
Dept: GASTROENTEROLOGY | Facility: CLINIC | Age: 70
End: 2025-09-04
Payer: MEDICARE

## 2025-09-04 DIAGNOSIS — C22.0 HEPATOCELLULAR CARCINOMA (H): Primary | ICD-10-CM

## (undated) DEVICE — DRAPE IOBAN INCISE 23X17" 6650EZ

## (undated) DEVICE — ESU ENDO SCISSORS 5MM CVD 5DCS

## (undated) DEVICE — ENDO TROCAR SLEEVE KII Z-THREADED 05X100MM CTS02

## (undated) DEVICE — TUBING SMOKE EVAC PNEUMOCLEAR HIGH FLOW 0620050250

## (undated) DEVICE — SURGICEL ABSORBABLE HEMOSTAT SNOW 2"X4" 2082

## (undated) DEVICE — SU VICRYL 0 TIE 54" J608H

## (undated) DEVICE — DRAPE ISOLATION BAG 1003

## (undated) DEVICE — PITCHER STERILE 1000ML  SSK9004A

## (undated) DEVICE — WIPES FOLEY CARE SURESTEP PROVON DFC100

## (undated) DEVICE — ENDO TROCAR FIRST ENTRY KII FIOS Z-THRD 05X100MM CTF03

## (undated) DEVICE — CATH TRAY FOLEY SURESTEP 16FR W/URNE MTR STLK LATEX A303316A

## (undated) DEVICE — DEVICE SUTURE PASSER 14GA WECK EFX EFXSP2

## (undated) DEVICE — SU MONOCRYL 4-0 PS-2 27" UND Y426H

## (undated) DEVICE — SUCTION MANIFOLD NEPTUNE 2 SYS 4 PORT 0702-020-000

## (undated) DEVICE — KIT PATIENT POSITIONING PIGAZZI LATEX FREE 40580

## (undated) DEVICE — SPONGE LAP 18X18" X8435

## (undated) DEVICE — NDL INSUFFLATION 13GA 120MM C2201

## (undated) DEVICE — Device

## (undated) DEVICE — ENDO TROCAR FIRST ENTRY KII FIOS Z-THRD 12X100MM CTF73

## (undated) DEVICE — LIGHT HANDLE X1 31140133

## (undated) DEVICE — PREP CHLORAPREP 26ML TINTED HI-LITE ORANGE 930815

## (undated) DEVICE — DRAPE SHEET REV FOLD 3/4 9349

## (undated) DEVICE — GLOVE BIOGEL PI MICRO SZ 7.5 48575

## (undated) DEVICE — SOL NACL 0.9% IRRIG 1000ML BOTTLE 2F7124

## (undated) DEVICE — PROBE PRESSURE NEUWAVE CERTUS 140 15GAX20CM PR20XT

## (undated) DEVICE — LINEN TOWEL PACK X6 WHITE 5487

## (undated) DEVICE — SOL WATER IRRIG 1000ML BOTTLE 2F7114

## (undated) DEVICE — BLADE CLIPPER SGL USE 9680

## (undated) DEVICE — COVER CAMERA IN-LIGHT DISP LT-C02

## (undated) DEVICE — ESU GROUND PAD ADULT W/CORD E7507

## (undated) DEVICE — SUCTION IRR STRYKERFLOW II W/TIP 250-070-520

## (undated) DEVICE — GLOVE BIOGEL PI MICRO SZ 7.0 48570

## (undated) DEVICE — DRAPE STERI TOWEL LG 1010

## (undated) DEVICE — SU DERMABOND ADVANCED .7ML DNX12

## (undated) DEVICE — LINEN TOWEL PACK X30 5481

## (undated) DEVICE — PAD CHUX UNDERPAD 23X24" 7136

## (undated) DEVICE — DRAPE STERI FLUOROSCOPE 35X43"1012 LATEX FREE

## (undated) DEVICE — ESU PENCIL SMOKE EVAC W/ROCKER SWITCH 0703-047-000

## (undated) DEVICE — ANTIFOG SOLUTION W/FOAM PAD 31142527

## (undated) RX ORDER — ENOXAPARIN SODIUM 100 MG/ML
INJECTION SUBCUTANEOUS
Status: DISPENSED
Start: 2024-04-16

## (undated) RX ORDER — HYDROMORPHONE HCL IN WATER/PF 6 MG/30 ML
PATIENT CONTROLLED ANALGESIA SYRINGE INTRAVENOUS
Status: DISPENSED
Start: 2024-04-16

## (undated) RX ORDER — DEXAMETHASONE SODIUM PHOSPHATE 4 MG/ML
INJECTION, SOLUTION INTRA-ARTICULAR; INTRALESIONAL; INTRAMUSCULAR; INTRAVENOUS; SOFT TISSUE
Status: DISPENSED
Start: 2023-09-20

## (undated) RX ORDER — SODIUM CHLORIDE 9 MG/ML
INJECTION, SOLUTION INTRAVENOUS
Status: DISPENSED
Start: 2023-11-01

## (undated) RX ORDER — LIDOCAINE HYDROCHLORIDE 10 MG/ML
INJECTION, SOLUTION EPIDURAL; INFILTRATION; INTRACAUDAL; PERINEURAL
Status: DISPENSED
Start: 2024-04-16

## (undated) RX ORDER — FENTANYL CITRATE 50 UG/ML
INJECTION, SOLUTION INTRAMUSCULAR; INTRAVENOUS
Status: DISPENSED
Start: 2024-04-16

## (undated) RX ORDER — ONDANSETRON 2 MG/ML
INJECTION INTRAMUSCULAR; INTRAVENOUS
Status: DISPENSED
Start: 2023-11-01

## (undated) RX ORDER — CEFAZOLIN SODIUM/WATER 2 G/20 ML
SYRINGE (ML) INTRAVENOUS
Status: DISPENSED
Start: 2023-09-20

## (undated) RX ORDER — FENTANYL CITRATE 50 UG/ML
INJECTION, SOLUTION INTRAMUSCULAR; INTRAVENOUS
Status: DISPENSED
Start: 2024-05-23

## (undated) RX ORDER — LIDOCAINE HYDROCHLORIDE 10 MG/ML
INJECTION, SOLUTION EPIDURAL; INFILTRATION; INTRACAUDAL; PERINEURAL
Status: DISPENSED
Start: 2023-09-20

## (undated) RX ORDER — FENTANYL CITRATE 50 UG/ML
INJECTION, SOLUTION INTRAMUSCULAR; INTRAVENOUS
Status: DISPENSED
Start: 2023-09-20

## (undated) RX ORDER — AMPICILLIN AND SULBACTAM 2; 1 G/1; G/1
INJECTION, POWDER, FOR SOLUTION INTRAMUSCULAR; INTRAVENOUS
Status: DISPENSED
Start: 2023-11-01

## (undated) RX ORDER — METRONIDAZOLE 500 MG/100ML
INJECTION, SOLUTION INTRAVENOUS
Status: DISPENSED
Start: 2024-04-16

## (undated) RX ORDER — PROPOFOL 10 MG/ML
INJECTION, EMULSION INTRAVENOUS
Status: DISPENSED
Start: 2023-09-20

## (undated) RX ORDER — ONDANSETRON 2 MG/ML
INJECTION INTRAMUSCULAR; INTRAVENOUS
Status: DISPENSED
Start: 2023-09-20

## (undated) RX ORDER — LEVOFLOXACIN 5 MG/ML
INJECTION, SOLUTION INTRAVENOUS
Status: DISPENSED
Start: 2024-04-16

## (undated) RX ORDER — HYDRALAZINE HYDROCHLORIDE 20 MG/ML
INJECTION INTRAMUSCULAR; INTRAVENOUS
Status: DISPENSED
Start: 2024-04-16

## (undated) RX ORDER — FENTANYL CITRATE 50 UG/ML
INJECTION, SOLUTION INTRAMUSCULAR; INTRAVENOUS
Status: DISPENSED
Start: 2025-03-28

## (undated) RX ORDER — LIDOCAINE HYDROCHLORIDE 10 MG/ML
INJECTION, SOLUTION EPIDURAL; INFILTRATION; INTRACAUDAL; PERINEURAL
Status: DISPENSED
Start: 2023-11-01

## (undated) RX ORDER — FENTANYL CITRATE 50 UG/ML
INJECTION, SOLUTION INTRAMUSCULAR; INTRAVENOUS
Status: DISPENSED
Start: 2023-11-01

## (undated) RX ORDER — SCOLOPAMINE TRANSDERMAL SYSTEM 1 MG/1
PATCH, EXTENDED RELEASE TRANSDERMAL
Status: DISPENSED
Start: 2023-11-01